# Patient Record
Sex: FEMALE | Race: BLACK OR AFRICAN AMERICAN | NOT HISPANIC OR LATINO
[De-identification: names, ages, dates, MRNs, and addresses within clinical notes are randomized per-mention and may not be internally consistent; named-entity substitution may affect disease eponyms.]

---

## 2017-01-03 ENCOUNTER — FORM ENCOUNTER (OUTPATIENT)
Age: 62
End: 2017-01-03

## 2017-01-04 ENCOUNTER — OUTPATIENT (OUTPATIENT)
Dept: OUTPATIENT SERVICES | Facility: HOSPITAL | Age: 62
LOS: 1 days | End: 2017-01-04
Payer: COMMERCIAL

## 2017-01-04 DIAGNOSIS — M16.11 UNILATERAL PRIMARY OSTEOARTHRITIS, RIGHT HIP: ICD-10-CM

## 2017-01-04 DIAGNOSIS — Z98.89 OTHER SPECIFIED POSTPROCEDURAL STATES: Chronic | ICD-10-CM

## 2017-01-04 DIAGNOSIS — M21.70 UNEQUAL LIMB LENGTH (ACQUIRED), UNSPECIFIED SITE: ICD-10-CM

## 2017-01-04 LAB
ANION GAP SERPL CALC-SCNC: 10 MMOL/L — SIGNIFICANT CHANGE UP (ref 9–16)
APPEARANCE UR: CLEAR — SIGNIFICANT CHANGE UP
APTT BLD: 33.4 SEC — SIGNIFICANT CHANGE UP (ref 27.5–37.4)
BACTERIA # UR AUTO: PRESENT /HPF
BILIRUB UR-MCNC: NEGATIVE — SIGNIFICANT CHANGE UP
BUN SERPL-MCNC: 17 MG/DL — SIGNIFICANT CHANGE UP (ref 7–23)
CALCIUM SERPL-MCNC: 8.9 MG/DL — SIGNIFICANT CHANGE UP (ref 8.5–10.5)
CHLORIDE SERPL-SCNC: 106 MMOL/L — SIGNIFICANT CHANGE UP (ref 96–108)
CO2 SERPL-SCNC: 27 MMOL/L — SIGNIFICANT CHANGE UP (ref 22–31)
COLOR SPEC: YELLOW — SIGNIFICANT CHANGE UP
CREAT SERPL-MCNC: 0.66 MG/DL — SIGNIFICANT CHANGE UP (ref 0.5–1.3)
DIFF PNL FLD: (no result)
EPI CELLS # UR: SIGNIFICANT CHANGE UP /HPF
GLUCOSE SERPL-MCNC: 93 MG/DL — SIGNIFICANT CHANGE UP (ref 70–99)
GLUCOSE UR QL: NEGATIVE — SIGNIFICANT CHANGE UP
HBA1C BLD-MCNC: 6.4 % — HIGH (ref 4.8–5.6)
HCT VFR BLD CALC: 33.9 % — LOW (ref 34.5–45)
HGB BLD-MCNC: 11.4 G/DL — LOW (ref 11.5–15.5)
INR BLD: 1.05 — SIGNIFICANT CHANGE UP (ref 0.88–1.16)
KETONES UR-MCNC: NEGATIVE — SIGNIFICANT CHANGE UP
LEUKOCYTE ESTERASE UR-ACNC: NEGATIVE — SIGNIFICANT CHANGE UP
MCHC RBC-ENTMCNC: 30.2 PG — SIGNIFICANT CHANGE UP (ref 27–34)
MCHC RBC-ENTMCNC: 33.6 G/DL — SIGNIFICANT CHANGE UP (ref 32–36)
MCV RBC AUTO: 89.9 FL — SIGNIFICANT CHANGE UP (ref 80–100)
NITRITE UR-MCNC: NEGATIVE — SIGNIFICANT CHANGE UP
PH UR: 5.5 — SIGNIFICANT CHANGE UP (ref 4–8)
PLATELET # BLD AUTO: 297 K/UL — SIGNIFICANT CHANGE UP (ref 150–400)
POTASSIUM SERPL-MCNC: 3.9 MMOL/L — SIGNIFICANT CHANGE UP (ref 3.5–5.3)
POTASSIUM SERPL-SCNC: 3.9 MMOL/L — SIGNIFICANT CHANGE UP (ref 3.5–5.3)
PROT UR-MCNC: NEGATIVE MG/DL — SIGNIFICANT CHANGE UP
PROTHROM AB SERPL-ACNC: 11.7 SEC — SIGNIFICANT CHANGE UP (ref 10–13.1)
RBC # BLD: 3.77 M/UL — LOW (ref 3.8–5.2)
RBC # FLD: 13.1 % — SIGNIFICANT CHANGE UP (ref 10.3–16.9)
RBC CASTS # UR COMP ASSIST: < 5 /HPF — SIGNIFICANT CHANGE UP
SODIUM SERPL-SCNC: 143 MMOL/L — SIGNIFICANT CHANGE UP (ref 135–145)
SP GR SPEC: <=1.005 — SIGNIFICANT CHANGE UP (ref 1–1.03)
UROBILINOGEN FLD QL: 0.2 E.U./DL — SIGNIFICANT CHANGE UP
WBC # BLD: 5.1 K/UL — SIGNIFICANT CHANGE UP (ref 3.8–10.5)
WBC # FLD AUTO: 5.1 K/UL — SIGNIFICANT CHANGE UP (ref 3.8–10.5)
WBC UR QL: < 5 /HPF — SIGNIFICANT CHANGE UP

## 2017-01-04 PROCEDURE — 83036 HEMOGLOBIN GLYCOSYLATED A1C: CPT

## 2017-01-04 PROCEDURE — 93005 ELECTROCARDIOGRAM TRACING: CPT

## 2017-01-04 PROCEDURE — 85027 COMPLETE CBC AUTOMATED: CPT

## 2017-01-04 PROCEDURE — 72100 X-RAY EXAM L-S SPINE 2/3 VWS: CPT

## 2017-01-04 PROCEDURE — 71046 X-RAY EXAM CHEST 2 VIEWS: CPT

## 2017-01-04 PROCEDURE — 81003 URINALYSIS AUTO W/O SCOPE: CPT

## 2017-01-04 PROCEDURE — 72100 X-RAY EXAM L-S SPINE 2/3 VWS: CPT | Mod: 26

## 2017-01-04 PROCEDURE — 81001 URINALYSIS AUTO W/SCOPE: CPT

## 2017-01-04 PROCEDURE — 71020: CPT | Mod: 26

## 2017-01-04 PROCEDURE — 80048 BASIC METABOLIC PNL TOTAL CA: CPT

## 2017-01-04 PROCEDURE — 72170 X-RAY EXAM OF PELVIS: CPT

## 2017-01-04 PROCEDURE — 93010 ELECTROCARDIOGRAM REPORT: CPT

## 2017-01-04 PROCEDURE — 85610 PROTHROMBIN TIME: CPT

## 2017-01-04 PROCEDURE — 85730 THROMBOPLASTIN TIME PARTIAL: CPT

## 2017-01-04 PROCEDURE — 72170 X-RAY EXAM OF PELVIS: CPT | Mod: 26

## 2017-01-04 PROCEDURE — 87086 URINE CULTURE/COLONY COUNT: CPT

## 2017-01-05 LAB
CULTURE RESULTS: NO GROWTH — SIGNIFICANT CHANGE UP
SPECIMEN SOURCE: SIGNIFICANT CHANGE UP

## 2017-01-09 ENCOUNTER — LABORATORY RESULT (OUTPATIENT)
Age: 62
End: 2017-01-09

## 2017-01-09 ENCOUNTER — APPOINTMENT (OUTPATIENT)
Dept: INTERNAL MEDICINE | Facility: CLINIC | Age: 62
End: 2017-01-09

## 2017-01-09 VITALS
HEART RATE: 78 BPM | TEMPERATURE: 97.4 F | SYSTOLIC BLOOD PRESSURE: 112 MMHG | OXYGEN SATURATION: 98 % | BODY MASS INDEX: 19.84 KG/M2 | DIASTOLIC BLOOD PRESSURE: 70 MMHG | WEIGHT: 112 LBS

## 2017-01-09 DIAGNOSIS — M21.612 BUNION OF LEFT FOOT: ICD-10-CM

## 2017-01-09 DIAGNOSIS — R22.1 LOCALIZED SWELLING, MASS AND LUMP, NECK: ICD-10-CM

## 2017-01-09 DIAGNOSIS — M19.90 UNSPECIFIED OSTEOARTHRITIS, UNSPECIFIED SITE: ICD-10-CM

## 2017-01-10 LAB
25(OH)D3 SERPL-MCNC: 30.4 NG/ML
ALBUMIN SERPL ELPH-MCNC: 4.2 G/DL
ALP BLD-CCNC: 78 U/L
ALT SERPL-CCNC: 21 U/L
ANION GAP SERPL CALC-SCNC: 12 MMOL/L
AST SERPL-CCNC: 24 U/L
BILIRUB SERPL-MCNC: 0.9 MG/DL
BUN SERPL-MCNC: 17 MG/DL
CALCIUM SERPL-MCNC: 9.6 MG/DL
CCP AB SER IA-ACNC: <8 UNITS
CHLORIDE SERPL-SCNC: 101 MMOL/L
CO2 SERPL-SCNC: 26 MMOL/L
CREAT SERPL-MCNC: 0.74 MG/DL
DEPRECATED KAPPA LC FREE/LAMBDA SER: 1.8 RATIO
FERRITIN SERPL-MCNC: 54.8 NG/ML
FOLATE SERPL-MCNC: 17.2 NG/ML
GLUCOSE SERPL-MCNC: 89 MG/DL
IGA SER QL IEP: 311 MG/DL
IGG SER QL IEP: 1540 MG/DL
IGM SER QL IEP: 151 MG/DL
IRON SATN MFR SERPL: 31 %
IRON SERPL-MCNC: 109 UG/DL
KAPPA LC CSF-MCNC: 1.16 MG/DL
KAPPA LC SERPL-MCNC: 2.09 MG/DL
POTASSIUM SERPL-SCNC: 4.4 MMOL/L
PROT SERPL-MCNC: 7.4 G/DL
RF+CCP IGG SER-IMP: NEGATIVE
RHEUMATOID FACT SER QL: <7 IU/ML
SODIUM SERPL-SCNC: 139 MMOL/L
TIBC SERPL-MCNC: 355 UG/DL
TSH SERPL-ACNC: 0.76 UIU/ML
UIBC SERPL-MCNC: 246 UG/DL
VIT B12 SERPL-MCNC: 852 PG/ML

## 2017-01-11 ENCOUNTER — OUTPATIENT (OUTPATIENT)
Dept: OUTPATIENT SERVICES | Facility: HOSPITAL | Age: 62
LOS: 1 days | End: 2017-01-11
Payer: COMMERCIAL

## 2017-01-11 DIAGNOSIS — Z98.89 OTHER SPECIFIED POSTPROCEDURAL STATES: Chronic | ICD-10-CM

## 2017-01-11 DIAGNOSIS — Z22.321 CARRIER OR SUSPECTED CARRIER OF METHICILLIN SUSCEPTIBLE STAPHYLOCOCCUS AUREUS: ICD-10-CM

## 2017-01-11 LAB
ALBUMIN MFR SERPL ELPH: 54.4 %
ALBUMIN SERPL-MCNC: 4.4 G/DL
ALBUMIN/GLOB SERPL: 1.2 RATIO
ALPHA1 GLOB MFR SERPL ELPH: 3.4 %
ALPHA1 GLOB SERPL ELPH-MCNC: 0.3 G/DL
ALPHA2 GLOB MFR SERPL ELPH: 10.5 %
ALPHA2 GLOB SERPL ELPH-MCNC: 0.8 G/DL
B-GLOBULIN MFR SERPL ELPH: 12.8 %
B-GLOBULIN SERPL ELPH-MCNC: 1 G/DL
GAMMA GLOB FLD ELPH-MCNC: 1.5 G/DL
GAMMA GLOB MFR SERPL ELPH: 18.9 %
INTERPRETATION SERPL IEP-IMP: NORMAL
M PROTEIN SPEC IFE-MCNC: NORMAL
PROT SERPL-MCNC: 8 G/DL
PROT SERPL-MCNC: 8 G/DL

## 2017-01-11 PROCEDURE — 76536 US EXAM OF HEAD AND NECK: CPT | Mod: 26

## 2017-01-11 PROCEDURE — 76536 US EXAM OF HEAD AND NECK: CPT

## 2017-01-11 PROCEDURE — 87641 MR-STAPH DNA AMP PROBE: CPT

## 2017-01-12 LAB
MRSA PCR RESULT.: NEGATIVE — SIGNIFICANT CHANGE UP
S AUREUS DNA NOSE QL NAA+PROBE: NEGATIVE — SIGNIFICANT CHANGE UP

## 2017-01-17 ENCOUNTER — CLINICAL ADVICE (OUTPATIENT)
Age: 62
End: 2017-01-17

## 2017-01-23 ENCOUNTER — FORM ENCOUNTER (OUTPATIENT)
Age: 62
End: 2017-01-23

## 2017-01-23 ENCOUNTER — RESULT REVIEW (OUTPATIENT)
Age: 62
End: 2017-01-23

## 2017-01-24 ENCOUNTER — OUTPATIENT (OUTPATIENT)
Dept: OUTPATIENT SERVICES | Facility: HOSPITAL | Age: 62
LOS: 1 days | End: 2017-01-24
Payer: COMMERCIAL

## 2017-01-24 DIAGNOSIS — Z98.89 OTHER SPECIFIED POSTPROCEDURAL STATES: Chronic | ICD-10-CM

## 2017-01-24 PROCEDURE — 10022: CPT

## 2017-01-24 PROCEDURE — 76942 ECHO GUIDE FOR BIOPSY: CPT | Mod: 26

## 2017-01-24 PROCEDURE — 76942 ECHO GUIDE FOR BIOPSY: CPT

## 2017-01-24 PROCEDURE — 88173 CYTOPATH EVAL FNA REPORT: CPT

## 2017-01-25 ENCOUNTER — RESULT REVIEW (OUTPATIENT)
Age: 62
End: 2017-01-25

## 2017-01-25 ENCOUNTER — MEDICATION RENEWAL (OUTPATIENT)
Age: 62
End: 2017-01-25

## 2017-01-25 VITALS
HEART RATE: 88 BPM | DIASTOLIC BLOOD PRESSURE: 73 MMHG | WEIGHT: 111.99 LBS | OXYGEN SATURATION: 100 % | RESPIRATION RATE: 16 BRPM | HEIGHT: 64 IN | TEMPERATURE: 98 F | SYSTOLIC BLOOD PRESSURE: 123 MMHG

## 2017-01-25 RX ORDER — OXYCODONE HYDROCHLORIDE 5 MG/1
20 TABLET ORAL ONCE
Qty: 0 | Refills: 0 | Status: DISCONTINUED | OUTPATIENT
Start: 2017-01-26 | End: 2017-01-26

## 2017-01-25 RX ORDER — CELECOXIB 200 MG/1
200 CAPSULE ORAL ONCE
Qty: 0 | Refills: 0 | Status: COMPLETED | OUTPATIENT
Start: 2017-01-26 | End: 2017-01-26

## 2017-01-25 NOTE — H&P ADULT. - FAMILY HISTORY
Father  Still living? Unknown  Family history of asthma, Age at diagnosis: Age Unknown  Family history of malignant neoplasm of prostate in father, Age at diagnosis: Age Unknown     Mother  Still living? Unknown  Family history of hypertension, Age at diagnosis: Age Unknown

## 2017-01-25 NOTE — H&P ADULT. - PMH
Acquired leg length discrepancy    Anemia  mild  Anxiety    Asthma    Cardiac murmur    Hyperthyroidism    Sarcoidosis    Tremor

## 2017-01-25 NOTE — H&P ADULT. - PROBLEM SELECTOR PLAN 1
Admit to Orthopaedic Service.  Presents today for elective Right Total Hip Arthroplasty, conversion of previous hip surgery.  Pt medially stable and cleared for procedure today by Dr. Deni MD; Dr. Mckenzie MD

## 2017-01-25 NOTE — H&P ADULT. - ASSESSMENT
62 yo F admitted with acquired leg length discrepancy, primary localized osteoarthritis of the right hip

## 2017-01-25 NOTE — H&P ADULT. - HISTORY OF PRESENT ILLNESS
62 yo F presents c/o right hip pain x    Presents today for elective Right Total Hip Arthroplasty, Conversion of previous surgery. 62 yo F presents c/o right hip pain x 7 years which began initially when a pt twisted her ankle and "felt a pop" in her right hip. She ended up requiring a Right hip labral repair. Pt pain has progressively worsened without improvement and has become increasingly unresponsive to conservative management. Pt reports constant pain and pins and needles in his right leg. Pain radiates from her low back to her leg. Pt ambulates with a cane 24/7 for assistance at home and in community. Denies numbness/tingling of extremities, F/C/N/V, CP, SOB today.     Presents today for elective Right Total Hip Arthroplasty, Conversion of previous surgery.

## 2017-01-25 NOTE — H&P ADULT. - MUSCULOSKELETAL COMMENTS
Minimally diminished sensation to Left great toe. Sensation intact elsewhere to bilateral LE distally. Motor Strength 5/5 to Quad/TA/GS/EHL/FHL bilaterally.

## 2017-01-26 ENCOUNTER — APPOINTMENT (OUTPATIENT)
Dept: ORTHOPEDIC SURGERY | Facility: HOSPITAL | Age: 62
End: 2017-01-26

## 2017-01-26 ENCOUNTER — INPATIENT (INPATIENT)
Facility: HOSPITAL | Age: 62
LOS: 1 days | Discharge: HOME | DRG: 470 | End: 2017-01-28
Attending: ORTHOPAEDIC SURGERY | Admitting: ORTHOPAEDIC SURGERY
Payer: COMMERCIAL

## 2017-01-26 DIAGNOSIS — Z98.890 OTHER SPECIFIED POSTPROCEDURAL STATES: Chronic | ICD-10-CM

## 2017-01-26 DIAGNOSIS — M21.70 UNEQUAL LIMB LENGTH (ACQUIRED), UNSPECIFIED SITE: ICD-10-CM

## 2017-01-26 DIAGNOSIS — Z98.89 OTHER SPECIFIED POSTPROCEDURAL STATES: Chronic | ICD-10-CM

## 2017-01-26 DIAGNOSIS — M16.11 UNILATERAL PRIMARY OSTEOARTHRITIS, RIGHT HIP: ICD-10-CM

## 2017-01-26 PROCEDURE — 72170 X-RAY EXAM OF PELVIS: CPT | Mod: 26

## 2017-01-26 PROCEDURE — 27130 TOTAL HIP ARTHROPLASTY: CPT | Mod: RT

## 2017-01-26 PROCEDURE — 76000 FLUOROSCOPY <1 HR PHYS/QHP: CPT | Mod: 26,59

## 2017-01-26 RX ORDER — ONDANSETRON 8 MG/1
4 TABLET, FILM COATED ORAL EVERY 6 HOURS
Qty: 0 | Refills: 0 | Status: DISCONTINUED | OUTPATIENT
Start: 2017-01-26 | End: 2017-01-28

## 2017-01-26 RX ORDER — MORPHINE SULFATE 50 MG/1
2 CAPSULE, EXTENDED RELEASE ORAL
Qty: 0 | Refills: 0 | Status: DISCONTINUED | OUTPATIENT
Start: 2017-01-26 | End: 2017-01-28

## 2017-01-26 RX ORDER — MORPHINE SULFATE 50 MG/1
4 CAPSULE, EXTENDED RELEASE ORAL
Qty: 0 | Refills: 0 | Status: DISCONTINUED | OUTPATIENT
Start: 2017-01-26 | End: 2017-01-28

## 2017-01-26 RX ORDER — SENNA PLUS 8.6 MG/1
2 TABLET ORAL AT BEDTIME
Qty: 0 | Refills: 0 | Status: DISCONTINUED | OUTPATIENT
Start: 2017-01-26 | End: 2017-01-28

## 2017-01-26 RX ORDER — FERROUS SULFATE 325(65) MG
0 TABLET ORAL
Qty: 0 | Refills: 0 | COMMUNITY

## 2017-01-26 RX ORDER — PANTOPRAZOLE SODIUM 20 MG/1
40 TABLET, DELAYED RELEASE ORAL DAILY
Qty: 0 | Refills: 0 | Status: DISCONTINUED | OUTPATIENT
Start: 2017-01-26 | End: 2017-01-28

## 2017-01-26 RX ORDER — ASPIRIN/CALCIUM CARB/MAGNESIUM 324 MG
325 TABLET ORAL
Qty: 0 | Refills: 0 | Status: DISCONTINUED | OUTPATIENT
Start: 2017-01-26 | End: 2017-01-28

## 2017-01-26 RX ORDER — CHOLECALCIFEROL (VITAMIN D3) 125 MCG
1 CAPSULE ORAL
Qty: 0 | Refills: 0 | COMMUNITY

## 2017-01-26 RX ORDER — ACETAMINOPHEN 500 MG
650 TABLET ORAL EVERY 6 HOURS
Qty: 0 | Refills: 0 | Status: DISCONTINUED | OUTPATIENT
Start: 2017-01-26 | End: 2017-01-28

## 2017-01-26 RX ORDER — CEFAZOLIN SODIUM 1 G
2000 VIAL (EA) INJECTION EVERY 8 HOURS
Qty: 0 | Refills: 0 | Status: COMPLETED | OUTPATIENT
Start: 2017-01-26 | End: 2017-01-27

## 2017-01-26 RX ORDER — BUPIVACAINE 13.3 MG/ML
20 INJECTION, SUSPENSION, LIPOSOMAL INFILTRATION ONCE
Qty: 0 | Refills: 0 | Status: DISCONTINUED | OUTPATIENT
Start: 2017-01-26 | End: 2017-01-28

## 2017-01-26 RX ORDER — CELECOXIB 200 MG/1
200 CAPSULE ORAL
Qty: 0 | Refills: 0 | Status: DISCONTINUED | OUTPATIENT
Start: 2017-01-26 | End: 2017-01-28

## 2017-01-26 RX ORDER — OXYCODONE HYDROCHLORIDE 5 MG/1
10 TABLET ORAL EVERY 12 HOURS
Qty: 0 | Refills: 0 | Status: DISCONTINUED | OUTPATIENT
Start: 2017-01-26 | End: 2017-01-28

## 2017-01-26 RX ORDER — POLYETHYLENE GLYCOL 3350 17 G/17G
17 POWDER, FOR SOLUTION ORAL DAILY
Qty: 0 | Refills: 0 | Status: DISCONTINUED | OUTPATIENT
Start: 2017-01-26 | End: 2017-01-28

## 2017-01-26 RX ORDER — MAGNESIUM HYDROXIDE 400 MG/1
30 TABLET, CHEWABLE ORAL DAILY
Qty: 0 | Refills: 0 | Status: DISCONTINUED | OUTPATIENT
Start: 2017-01-26 | End: 2017-01-28

## 2017-01-26 RX ORDER — OXYCODONE HYDROCHLORIDE 5 MG/1
10 TABLET ORAL EVERY 4 HOURS
Qty: 0 | Refills: 0 | Status: DISCONTINUED | OUTPATIENT
Start: 2017-01-26 | End: 2017-01-28

## 2017-01-26 RX ORDER — OXYCODONE HYDROCHLORIDE 5 MG/1
5 TABLET ORAL EVERY 4 HOURS
Qty: 0 | Refills: 0 | Status: DISCONTINUED | OUTPATIENT
Start: 2017-01-26 | End: 2017-01-28

## 2017-01-26 RX ORDER — DOCUSATE SODIUM 100 MG
100 CAPSULE ORAL THREE TIMES A DAY
Qty: 0 | Refills: 0 | Status: DISCONTINUED | OUTPATIENT
Start: 2017-01-26 | End: 2017-01-28

## 2017-01-26 RX ORDER — MORPHINE SULFATE 50 MG/1
2 CAPSULE, EXTENDED RELEASE ORAL
Qty: 0 | Refills: 0 | Status: DISCONTINUED | OUTPATIENT
Start: 2017-01-26 | End: 2017-01-26

## 2017-01-26 RX ORDER — PREGABALIN 225 MG/1
1 CAPSULE ORAL
Qty: 0 | Refills: 0 | COMMUNITY

## 2017-01-26 RX ORDER — KETOROLAC TROMETHAMINE 30 MG/ML
15 SYRINGE (ML) INJECTION EVERY 4 HOURS
Qty: 0 | Refills: 0 | Status: DISCONTINUED | OUTPATIENT
Start: 2017-01-26 | End: 2017-01-28

## 2017-01-26 RX ORDER — SODIUM CHLORIDE 9 MG/ML
1000 INJECTION, SOLUTION INTRAVENOUS
Qty: 0 | Refills: 0 | Status: DISCONTINUED | OUTPATIENT
Start: 2017-01-26 | End: 2017-01-28

## 2017-01-26 RX ADMIN — Medication 100 MILLIGRAM(S): at 16:30

## 2017-01-26 RX ADMIN — OXYCODONE HYDROCHLORIDE 10 MILLIGRAM(S): 5 TABLET ORAL at 22:04

## 2017-01-26 RX ADMIN — ONDANSETRON 4 MILLIGRAM(S): 8 TABLET, FILM COATED ORAL at 21:59

## 2017-01-26 RX ADMIN — Medication 100 MILLIGRAM(S): at 22:04

## 2017-01-26 RX ADMIN — OXYCODONE HYDROCHLORIDE 20 MILLIGRAM(S): 5 TABLET ORAL at 07:12

## 2017-01-26 RX ADMIN — Medication 325 MILLIGRAM(S): at 22:04

## 2017-01-26 RX ADMIN — OXYCODONE HYDROCHLORIDE 10 MILLIGRAM(S): 5 TABLET ORAL at 23:04

## 2017-01-26 RX ADMIN — SODIUM CHLORIDE 120 MILLILITER(S): 9 INJECTION, SOLUTION INTRAVENOUS at 14:31

## 2017-01-26 RX ADMIN — SENNA PLUS 2 TABLET(S): 8.6 TABLET ORAL at 22:04

## 2017-01-26 RX ADMIN — CELECOXIB 200 MILLIGRAM(S): 200 CAPSULE ORAL at 07:12

## 2017-01-26 RX ADMIN — SODIUM CHLORIDE 120 MILLILITER(S): 9 INJECTION, SOLUTION INTRAVENOUS at 22:04

## 2017-01-26 NOTE — PHYSICAL THERAPY INITIAL EVALUATION ADULT - PERTINENT HX OF CURRENT PROBLEM, REHAB EVAL
62 yo F presents c/o right hip pain x 7 years which began initially when a pt twisted her ankle and "felt a pop" in her right hip. She ended up requiring a Right hip labral repair. Pt pain has progressively worsened without improvement and has become increasingly unresponsive to conservative management. Pt reports constant pain and pins and needles in his right leg. Pain radiates from her low back to her leg. Pt ambulates with a cane 24/7

## 2017-01-26 NOTE — PHYSICAL THERAPY INITIAL EVALUATION ADULT - ADDITIONAL COMMENTS
Patient lives in one floor apartment with 8 steps to enter and a railing on the left side. Patient ambulates with a cane at all times and can ambulate for 15city blocks at baseline.

## 2017-01-26 NOTE — PROGRESS NOTE ADULT - SUBJECTIVE AND OBJECTIVE BOX
Ortho Post Op Check    Procedure: Right THR  Surgeon: Dr. Moon    Pt comfortable without complaints, pain controlled  Denies CP, SOB, N/V, numbness/tingling     Vital Signs Last 24 Hrs  T(C): 36.3, Max: 36.8 (01-26 @ 12:15)  T(F): 97.4, Max: 98.2 (01-26 @ 12:15)  HR: 83 (80 - 85)  BP: 121/73 (98/66 - 121/73)  BP(mean): --  RR: 16 (16 - 16)  SpO2: 100% (100% - 100%)  Wt(kg): --    General: Pt Alert and oriented, NAD  DSG C/D/I Right hip  Pulse DP 2+ Right LE  Sensation intact Right LE  Motor: EHL/FHL/TA/GS 5/5 Right LE    Post-op X-Ray: prosthesis in place    A/P: 61yFemale POD#0 s/p Right THR  - Stable  - Pain Control  - DVT ppx: ASA  - Post op abx: Ancef  - PT, WBS: WBAT    Ortho Pager [phone]

## 2017-01-27 ENCOUNTER — TRANSCRIPTION ENCOUNTER (OUTPATIENT)
Age: 62
End: 2017-01-27

## 2017-01-27 LAB
ANION GAP SERPL CALC-SCNC: 9 MMOL/L — SIGNIFICANT CHANGE UP (ref 9–16)
BUN SERPL-MCNC: 9 MG/DL — SIGNIFICANT CHANGE UP (ref 7–23)
CALCIUM SERPL-MCNC: 8.5 MG/DL — SIGNIFICANT CHANGE UP (ref 8.5–10.5)
CHLORIDE SERPL-SCNC: 103 MMOL/L — SIGNIFICANT CHANGE UP (ref 96–108)
CO2 SERPL-SCNC: 26 MMOL/L — SIGNIFICANT CHANGE UP (ref 22–31)
CREAT SERPL-MCNC: 0.48 MG/DL — LOW (ref 0.5–1.3)
GLUCOSE SERPL-MCNC: 132 MG/DL — HIGH (ref 70–99)
HCT VFR BLD CALC: 29.4 % — LOW (ref 34.5–45)
HGB BLD-MCNC: 10 G/DL — LOW (ref 11.5–15.5)
MCHC RBC-ENTMCNC: 29.9 PG — SIGNIFICANT CHANGE UP (ref 27–34)
MCHC RBC-ENTMCNC: 34 G/DL — SIGNIFICANT CHANGE UP (ref 32–36)
MCV RBC AUTO: 88 FL — SIGNIFICANT CHANGE UP (ref 80–100)
PLATELET # BLD AUTO: 243 K/UL — SIGNIFICANT CHANGE UP (ref 150–400)
POTASSIUM SERPL-MCNC: 4.1 MMOL/L — SIGNIFICANT CHANGE UP (ref 3.5–5.3)
POTASSIUM SERPL-SCNC: 4.1 MMOL/L — SIGNIFICANT CHANGE UP (ref 3.5–5.3)
RBC # BLD: 3.34 M/UL — LOW (ref 3.8–5.2)
RBC # FLD: 11.8 % — SIGNIFICANT CHANGE UP (ref 10.3–16.9)
SODIUM SERPL-SCNC: 138 MMOL/L — SIGNIFICANT CHANGE UP (ref 135–145)
WBC # BLD: 10.9 K/UL — HIGH (ref 3.8–10.5)
WBC # FLD AUTO: 10.9 K/UL — HIGH (ref 3.8–10.5)

## 2017-01-27 RX ORDER — SCOPALAMINE 1 MG/3D
1.5 PATCH, EXTENDED RELEASE TRANSDERMAL ONCE
Qty: 0 | Refills: 0 | Status: COMPLETED | OUTPATIENT
Start: 2017-01-27 | End: 2017-01-27

## 2017-01-27 RX ADMIN — PANTOPRAZOLE SODIUM 40 MILLIGRAM(S): 20 TABLET, DELAYED RELEASE ORAL at 13:20

## 2017-01-27 RX ADMIN — Medication 100 MILLIGRAM(S): at 00:22

## 2017-01-27 RX ADMIN — CELECOXIB 200 MILLIGRAM(S): 200 CAPSULE ORAL at 17:56

## 2017-01-27 RX ADMIN — CELECOXIB 200 MILLIGRAM(S): 200 CAPSULE ORAL at 13:20

## 2017-01-27 RX ADMIN — Medication 325 MILLIGRAM(S): at 17:56

## 2017-01-27 RX ADMIN — Medication 1 TABLET(S): at 13:20

## 2017-01-27 RX ADMIN — OXYCODONE HYDROCHLORIDE 10 MILLIGRAM(S): 5 TABLET ORAL at 11:30

## 2017-01-27 RX ADMIN — Medication 325 MILLIGRAM(S): at 06:10

## 2017-01-27 RX ADMIN — OXYCODONE HYDROCHLORIDE 10 MILLIGRAM(S): 5 TABLET ORAL at 11:02

## 2017-01-27 RX ADMIN — CELECOXIB 200 MILLIGRAM(S): 200 CAPSULE ORAL at 18:30

## 2017-01-27 RX ADMIN — CELECOXIB 200 MILLIGRAM(S): 200 CAPSULE ORAL at 14:00

## 2017-01-27 RX ADMIN — SCOPALAMINE 1.5 MILLIGRAM(S): 1 PATCH, EXTENDED RELEASE TRANSDERMAL at 09:40

## 2017-01-27 RX ADMIN — SENNA PLUS 2 TABLET(S): 8.6 TABLET ORAL at 20:30

## 2017-01-27 RX ADMIN — Medication 100 MILLIGRAM(S): at 13:20

## 2017-01-27 RX ADMIN — ONDANSETRON 4 MILLIGRAM(S): 8 TABLET, FILM COATED ORAL at 09:18

## 2017-01-27 RX ADMIN — Medication 100 MILLIGRAM(S): at 20:30

## 2017-01-27 RX ADMIN — Medication 100 MILLIGRAM(S): at 06:10

## 2017-01-27 RX ADMIN — POLYETHYLENE GLYCOL 3350 17 GRAM(S): 17 POWDER, FOR SOLUTION ORAL at 13:20

## 2017-01-27 NOTE — DISCHARGE NOTE ADULT - CARE PLAN
Principal Discharge DX:	Primary localized osteoarthritis of right hip  Goal:	Pain Management, improve mobility  Instructions for follow-up, activity and diet:	see below

## 2017-01-27 NOTE — DISCHARGE NOTE ADULT - CARE PROVIDER_API CALL
Manjinder Moon), Orthopaedic Surgery  [address] [place] [address]  Phone: [phone]  Fax: [phone]

## 2017-01-27 NOTE — PROGRESS NOTE ADULT - SUBJECTIVE AND OBJECTIVE BOX
S: Patient seen and examined. No acute events overnight.    ICU Vital Signs Last 24 Hrs  T(C): 36.4, Max: 36.8 (01-26 @ 12:15)  T(F): 97.6, Max: 98.2 (01-26 @ 12:15)  HR: 76 (76 - 85)  BP: 100/59 (93/46 - 131/79)  BP(mean): --  ABP: --  ABP(mean): --  RR: 16 (16 - 16)  SpO2: 100% (98% - 100%)    Motor: 5/5 GS/TA/EHL/FHL    Sensation: SILT distally  Pulses: WWP, DP/PT 2+    Dressing: Aquacel C/D/I              A/P:  61y Female s/p R anterior FLACA, POD # 1    -Stable  -Pain Control  -PT/WBAT  -DVT ppx: ASA BID  -Advance diet as tolerated  -f/u AM labs  -Dispo: Home v Rehab    Elvin Branch MD  Ortho Surg Resident

## 2017-01-27 NOTE — DISCHARGE NOTE ADULT - ADDITIONAL INSTRUCTIONS
No strenuous activity, heavy lifting, driving, tub bathing, or returning to work until cleared by MD.  You may shower--dressing is waterproof.  Remove dressing after post op day 7, then leave incision open to air.  Follow up with Dr. Moon call  to schedule an appt within 10-14 days.  If you don't have a bowel movement by post op day 3, then take Milk of Magnesia (over the counter).  If no bowel movement by at least post op day 5, then use a Dulcolax suppository (over the counter) and/or a Fleets enema--if still no bowel movement, call your MD.  Contact your doctor if you experience: fever greater than 101.5, chills, chest pain, difficulty breathing, bleeding, redness or heat around the incision.     Please follow up with your primary care provider.

## 2017-01-27 NOTE — DISCHARGE NOTE ADULT - PATIENT PORTAL LINK FT
“You can access the [facility] Patient Portal, offered by , by registering with the following website: [email or web address]”

## 2017-01-27 NOTE — DISCHARGE NOTE ADULT - MEDICATION SUMMARY - MEDICATIONS TO TAKE
I will START or STAY ON the medications listed below when I get home from the hospital:    advair  --     -- Indication: For Home med    omega fish oil  -- 1000 milligram(s) by mouth once a day  -- Indication: For Home med    ferrous sulfate 200 mg (65 mg elemental iron) oral tablet  --  by mouth once a day  -- Indication: For Home med    Vitamin D3 400 intl units oral capsule  -- 1 cap(s) by mouth once a day  -- Indication: For Home med    Vitamin B12 500 mcg oral tablet  -- 1 tab(s) by mouth once a day  -- Indication: For Home med

## 2017-01-27 NOTE — PROGRESS NOTE ADULT - SUBJECTIVE AND OBJECTIVE BOX
ORTHO NOTE    [ x] Pt seen/examined. 9am  [ ] Pt without any complaints/in NAD.    [x ] Pt complains of: Incisional pain     [ ] ROS  otherwise negative    .    PHYSICAL EXAM:    Vital Signs Last 24 Hrs  T(C): 36.1, Max: 36.8 (01-26 @ 12:15)  T(F): 97, Max: 98.2 (01-26 @ 12:15)  HR: 66 (66 - 85)  BP: 101/66 (93/46 - 131/79)  BP(mean): --  RR: 16 (16 - 16)  SpO2: 100% (98% - 100%)    I&O's Detail  I & Os for 24h ending 27 Jan 2017 07:00  =============================================  IN:    lactated ringers.: 1320 ml    Other: 360 ml    IV PiggyBack: 50 ml    Total IN: 1730 ml  ---------------------------------------------  OUT:    Voided: 1400 ml    Total OUT: 1400 ml  ---------------------------------------------  Total NET: 330 ml    I & Os for current day (as of 27 Jan 2017 11:05)  =============================================  IN:    Oral Fluid: 300 ml    Total IN: 300 ml  ---------------------------------------------  OUT:    Voided: 950 ml    Total OUT: 950 ml  ---------------------------------------------  Total NET: -650 ml       CAPILLARY BLOOD GLUCOSE                  Neuro: A&0x3    Lungs: Denies SOB    CV: Denies chest pain    ABD: NON distended positive bowel sounds      Ext: NVID Dressing clean dry and intact.      LABS                        10.0   10.9  )-----------( 243      ( 27 Jan 2017 06:42 )             29.4                                27 Jan 2017 06:41    138    |  103    |  9      ----------------------------<  132    4.1     |  26     |  0.48     Ca    8.5        27 Jan 2017 06:41         ASSESSMENT/PLAN:      STATUS POST: Right THR (anterior)    CONTINUE:          [x ] PT WBAT    [x ] DVT PPX- ASA    x[ ] Pain MgtMEDICATIONS  (PRN):  ketorolac   Injectable 15milliGRAM(s) IV Push every 4 hours PRN Moderate Pain (4 - 6)  oxyCODONE IR 5milliGRAM(s) Oral every 4 hours PRN Mild Pain  oxyCODONE IR 10milliGRAM(s) Oral every 4 hours PRN Moderate Pain  morphine  - Injectable 4milliGRAM(s) IV Push every 15 minutes PRN Severe Pain (7 - 10)  morphine  - Injectable 2milliGRAM(s) IV Push every 3 hours PRN Severe Pain      [x ] Dispo plan- Home

## 2017-01-27 NOTE — DISCHARGE NOTE ADULT - HOSPITAL COURSE
Admit 1/26/17  Surgery S/P Right FLACA (anterior) 1/26/17  Pre/post-op Antibiotics  DVT prophylaxis  Physical Therapy  Pain Management

## 2017-01-28 VITALS
TEMPERATURE: 98 F | OXYGEN SATURATION: 98 % | SYSTOLIC BLOOD PRESSURE: 130 MMHG | DIASTOLIC BLOOD PRESSURE: 62 MMHG | RESPIRATION RATE: 16 BRPM

## 2017-01-28 LAB
ANION GAP SERPL CALC-SCNC: 5 MMOL/L — LOW (ref 9–16)
BUN SERPL-MCNC: 10 MG/DL — SIGNIFICANT CHANGE UP (ref 7–23)
CALCIUM SERPL-MCNC: 8.6 MG/DL — SIGNIFICANT CHANGE UP (ref 8.5–10.5)
CHLORIDE SERPL-SCNC: 106 MMOL/L — SIGNIFICANT CHANGE UP (ref 96–108)
CO2 SERPL-SCNC: 29 MMOL/L — SIGNIFICANT CHANGE UP (ref 22–31)
CREAT SERPL-MCNC: 0.66 MG/DL — SIGNIFICANT CHANGE UP (ref 0.5–1.3)
GLUCOSE SERPL-MCNC: 121 MG/DL — HIGH (ref 70–99)
HCT VFR BLD CALC: 30.2 % — LOW (ref 34.5–45)
HGB BLD-MCNC: 10.1 G/DL — LOW (ref 11.5–15.5)
MCHC RBC-ENTMCNC: 30.1 PG — SIGNIFICANT CHANGE UP (ref 27–34)
MCHC RBC-ENTMCNC: 33.4 G/DL — SIGNIFICANT CHANGE UP (ref 32–36)
MCV RBC AUTO: 89.9 FL — SIGNIFICANT CHANGE UP (ref 80–100)
NON-GYNECOLOGICAL CYTOLOGY STUDY: SIGNIFICANT CHANGE UP
PLATELET # BLD AUTO: 232 K/UL — SIGNIFICANT CHANGE UP (ref 150–400)
POTASSIUM SERPL-MCNC: 3.8 MMOL/L — SIGNIFICANT CHANGE UP (ref 3.5–5.3)
POTASSIUM SERPL-SCNC: 3.8 MMOL/L — SIGNIFICANT CHANGE UP (ref 3.5–5.3)
RBC # BLD: 3.36 M/UL — LOW (ref 3.8–5.2)
RBC # FLD: 12.4 % — SIGNIFICANT CHANGE UP (ref 10.3–16.9)
SODIUM SERPL-SCNC: 140 MMOL/L — SIGNIFICANT CHANGE UP (ref 135–145)
WBC # BLD: 8.8 K/UL — SIGNIFICANT CHANGE UP (ref 3.8–10.5)
WBC # FLD AUTO: 8.8 K/UL — SIGNIFICANT CHANGE UP (ref 3.8–10.5)

## 2017-01-28 PROCEDURE — 86850 RBC ANTIBODY SCREEN: CPT

## 2017-01-28 PROCEDURE — 85027 COMPLETE CBC AUTOMATED: CPT

## 2017-01-28 PROCEDURE — 36415 COLL VENOUS BLD VENIPUNCTURE: CPT

## 2017-01-28 PROCEDURE — 72170 X-RAY EXAM OF PELVIS: CPT

## 2017-01-28 PROCEDURE — 86901 BLOOD TYPING SEROLOGIC RH(D): CPT

## 2017-01-28 PROCEDURE — 86900 BLOOD TYPING SEROLOGIC ABO: CPT

## 2017-01-28 PROCEDURE — C1776: CPT

## 2017-01-28 PROCEDURE — 88300 SURGICAL PATH GROSS: CPT

## 2017-01-28 PROCEDURE — 76000 FLUOROSCOPY <1 HR PHYS/QHP: CPT

## 2017-01-28 PROCEDURE — 97161 PT EVAL LOW COMPLEX 20 MIN: CPT

## 2017-01-28 PROCEDURE — 80048 BASIC METABOLIC PNL TOTAL CA: CPT

## 2017-01-28 PROCEDURE — 97116 GAIT TRAINING THERAPY: CPT

## 2017-01-28 PROCEDURE — C1713: CPT

## 2017-01-28 RX ADMIN — PANTOPRAZOLE SODIUM 40 MILLIGRAM(S): 20 TABLET, DELAYED RELEASE ORAL at 13:38

## 2017-01-28 RX ADMIN — CELECOXIB 200 MILLIGRAM(S): 200 CAPSULE ORAL at 14:30

## 2017-01-28 RX ADMIN — OXYCODONE HYDROCHLORIDE 10 MILLIGRAM(S): 5 TABLET ORAL at 13:38

## 2017-01-28 RX ADMIN — Medication 325 MILLIGRAM(S): at 19:26

## 2017-01-28 RX ADMIN — CELECOXIB 200 MILLIGRAM(S): 200 CAPSULE ORAL at 13:38

## 2017-01-28 RX ADMIN — Medication 100 MILLIGRAM(S): at 13:38

## 2017-01-28 RX ADMIN — CELECOXIB 200 MILLIGRAM(S): 200 CAPSULE ORAL at 19:26

## 2017-01-28 RX ADMIN — Medication 1 TABLET(S): at 13:38

## 2017-01-28 RX ADMIN — CELECOXIB 200 MILLIGRAM(S): 200 CAPSULE ORAL at 20:00

## 2017-01-28 RX ADMIN — Medication 30 MILLILITER(S): at 14:46

## 2017-01-28 RX ADMIN — Medication 325 MILLIGRAM(S): at 06:08

## 2017-01-28 RX ADMIN — OXYCODONE HYDROCHLORIDE 10 MILLIGRAM(S): 5 TABLET ORAL at 14:30

## 2017-01-28 RX ADMIN — Medication 100 MILLIGRAM(S): at 06:08

## 2017-01-28 NOTE — PROGRESS NOTE ADULT - SUBJECTIVE AND OBJECTIVE BOX
S: Patient seen and examined. Pt. doing well, Pain endorsed but controlled. No acute events overnight.    ICU Vital Signs Last 24 Hrs  T(C): 36.8, Max: 36.9 (01-27 @ 15:26)  T(F): 98.2, Max: 98.5 (01-27 @ 15:26)  HR: 98 (66 - 98)  BP: 109/62 (97/59 - 109/62)  BP(mean): --  ABP: --  ABP(mean): --  RR: 16 (15 - 16)  SpO2: 100% (97% - 100%)    Motor: 5/5 GS/TA/EHL/FHL    Sensation: SILT   Pulses: WWP, DP/PT 2+    Dressing: C/D/I                          10.0   10.9  )-----------( 243      ( 27 Jan 2017 06:42 )             29.4             A/P:  61y Female s/p  R anterior FLACA  , POD#   2  -Stable  -Pain Control  -PT/WBAT  -DVT ppx: ASA  -Advance diet as tolerated  -f/u AM labs  -Dispo: Home today    Elvin Branch MD  Ortho Surg Resident

## 2017-02-01 DIAGNOSIS — F41.9 ANXIETY DISORDER, UNSPECIFIED: ICD-10-CM

## 2017-02-01 DIAGNOSIS — M16.11 UNILATERAL PRIMARY OSTEOARTHRITIS, RIGHT HIP: ICD-10-CM

## 2017-02-01 DIAGNOSIS — D86.9 SARCOIDOSIS, UNSPECIFIED: ICD-10-CM

## 2017-02-01 DIAGNOSIS — K21.9 GASTRO-ESOPHAGEAL REFLUX DISEASE WITHOUT ESOPHAGITIS: ICD-10-CM

## 2017-02-01 DIAGNOSIS — M21.70 UNEQUAL LIMB LENGTH (ACQUIRED), UNSPECIFIED SITE: ICD-10-CM

## 2017-02-01 DIAGNOSIS — D64.9 ANEMIA, UNSPECIFIED: ICD-10-CM

## 2017-02-01 DIAGNOSIS — J45.909 UNSPECIFIED ASTHMA, UNCOMPLICATED: ICD-10-CM

## 2017-02-02 NOTE — DISCHARGE NOTE ADULT - NSTOBACCONEVERSMOKERY/N_GEN_A
----- Message from Kirs Kraus MA sent at 2/1/2017  8:26 AM CST -----  Contact: self - [phone]  Requesting to speak a Nurse this morning regarding a health issue. Refused to leave details. Please call. Thanks!   
Attempted to contact pt to address her concerns, no answer left vm.  
Get a U/A and culture, if possible  Spoke to treasure  
No

## 2017-02-07 LAB — SURGICAL PATHOLOGY STUDY: SIGNIFICANT CHANGE UP

## 2017-02-14 ENCOUNTER — FORM ENCOUNTER (OUTPATIENT)
Age: 62
End: 2017-02-14

## 2017-02-15 ENCOUNTER — OUTPATIENT (OUTPATIENT)
Dept: OUTPATIENT SERVICES | Facility: HOSPITAL | Age: 62
LOS: 1 days | End: 2017-02-15
Payer: COMMERCIAL

## 2017-02-15 ENCOUNTER — APPOINTMENT (OUTPATIENT)
Dept: ORTHOPEDIC SURGERY | Facility: CLINIC | Age: 62
End: 2017-02-15

## 2017-02-15 DIAGNOSIS — Z98.89 OTHER SPECIFIED POSTPROCEDURAL STATES: Chronic | ICD-10-CM

## 2017-02-15 DIAGNOSIS — Z98.890 OTHER SPECIFIED POSTPROCEDURAL STATES: Chronic | ICD-10-CM

## 2017-02-15 PROBLEM — D86.9 SARCOIDOSIS, UNSPECIFIED: Chronic | Status: ACTIVE | Noted: 2017-01-25

## 2017-02-15 PROBLEM — R25.1 TREMOR, UNSPECIFIED: Chronic | Status: ACTIVE | Noted: 2017-01-25

## 2017-02-15 PROBLEM — E05.90 THYROTOXICOSIS, UNSPECIFIED WITHOUT THYROTOXIC CRISIS OR STORM: Chronic | Status: ACTIVE | Noted: 2017-01-25

## 2017-02-15 PROBLEM — R01.1 CARDIAC MURMUR, UNSPECIFIED: Chronic | Status: ACTIVE | Noted: 2017-01-25

## 2017-02-15 PROBLEM — M21.70 UNEQUAL LIMB LENGTH (ACQUIRED), UNSPECIFIED SITE: Chronic | Status: ACTIVE | Noted: 2017-01-25

## 2017-02-15 PROBLEM — D64.9 ANEMIA, UNSPECIFIED: Chronic | Status: ACTIVE | Noted: 2017-01-25

## 2017-02-15 PROCEDURE — 73501 X-RAY EXAM HIP UNI 1 VIEW: CPT

## 2017-02-15 PROCEDURE — 73501 X-RAY EXAM HIP UNI 1 VIEW: CPT | Mod: 26,RT

## 2017-02-15 RX ORDER — OXYCODONE AND ACETAMINOPHEN 5; 325 MG/1; MG/1
5-325 TABLET ORAL
Qty: 70 | Refills: 0 | Status: DISCONTINUED | COMMUNITY
Start: 2017-01-25 | End: 2017-02-15

## 2017-02-27 ENCOUNTER — CLINICAL ADVICE (OUTPATIENT)
Age: 62
End: 2017-02-27

## 2017-03-20 ENCOUNTER — APPOINTMENT (OUTPATIENT)
Dept: ORTHOPEDIC SURGERY | Facility: CLINIC | Age: 62
End: 2017-03-20

## 2017-03-20 RX ORDER — CELECOXIB 200 MG/1
200 CAPSULE ORAL TWICE DAILY
Qty: 84 | Refills: 0 | Status: DISCONTINUED | COMMUNITY
Start: 2017-01-25 | End: 2017-03-20

## 2017-03-20 RX ORDER — ASPIRIN/ACETAMINOPHEN/CAFFEINE 500-325-65
325 POWDER IN PACKET (EA) ORAL
Qty: 60 | Refills: 0 | Status: DISCONTINUED | COMMUNITY
Start: 2017-01-25 | End: 2017-03-20

## 2017-03-20 RX ORDER — PANTOPRAZOLE 40 MG/1
40 TABLET, DELAYED RELEASE ORAL DAILY
Qty: 30 | Refills: 0 | Status: DISCONTINUED | COMMUNITY
Start: 2017-01-25 | End: 2017-03-20

## 2017-03-29 ENCOUNTER — APPOINTMENT (OUTPATIENT)
Dept: INTERNAL MEDICINE | Facility: CLINIC | Age: 62
End: 2017-03-29

## 2017-03-29 VITALS
SYSTOLIC BLOOD PRESSURE: 110 MMHG | OXYGEN SATURATION: 97 % | BODY MASS INDEX: 15.85 KG/M2 | HEIGHT: 72 IN | DIASTOLIC BLOOD PRESSURE: 70 MMHG | HEART RATE: 78 BPM | WEIGHT: 117 LBS | TEMPERATURE: 97.8 F

## 2017-04-03 LAB
CHOLEST SERPL-MCNC: 202 MG/DL
CHOLEST/HDLC SERPL: 2.6 RATIO
HBA1C MFR BLD HPLC: 5.9 %
HCV AB SER QL: NONREACTIVE
HCV S/CO RATIO: 0.19 S/CO
HDLC SERPL-MCNC: 79 MG/DL
HIV1+2 AB SPEC QL IA.RAPID: NONREACTIVE
LDLC SERPL CALC-MCNC: 107 MG/DL
TRIGL SERPL-MCNC: 81 MG/DL

## 2017-04-12 ENCOUNTER — APPOINTMENT (OUTPATIENT)
Dept: ORTHOPEDIC SURGERY | Facility: CLINIC | Age: 62
End: 2017-04-12

## 2017-04-20 DIAGNOSIS — R92.8 OTHER ABNORMAL AND INCONCLUSIVE FINDINGS ON DIAGNOSTIC IMAGING OF BREAST: ICD-10-CM

## 2017-04-28 ENCOUNTER — OUTPATIENT (OUTPATIENT)
Dept: OUTPATIENT SERVICES | Facility: HOSPITAL | Age: 62
LOS: 1 days | End: 2017-04-28
Payer: COMMERCIAL

## 2017-04-28 DIAGNOSIS — Z98.890 OTHER SPECIFIED POSTPROCEDURAL STATES: Chronic | ICD-10-CM

## 2017-04-28 DIAGNOSIS — Z98.89 OTHER SPECIFIED POSTPROCEDURAL STATES: Chronic | ICD-10-CM

## 2017-04-28 PROBLEM — R92.8 ABNORMAL MAMMOGRAM: Status: ACTIVE | Noted: 2017-04-28

## 2017-04-28 PROCEDURE — G0204: CPT | Mod: 26

## 2017-04-28 PROCEDURE — 76641 ULTRASOUND BREAST COMPLETE: CPT | Mod: 26,50

## 2017-04-28 PROCEDURE — 76641 ULTRASOUND BREAST COMPLETE: CPT

## 2017-04-28 PROCEDURE — 77066 DX MAMMO INCL CAD BI: CPT

## 2017-05-15 ENCOUNTER — CLINICAL ADVICE (OUTPATIENT)
Age: 62
End: 2017-05-15

## 2017-05-15 RX ORDER — ALBUTEROL SULFATE 1.25 MG/3ML
1.25 SOLUTION RESPIRATORY (INHALATION)
Qty: 2 | Refills: 0 | Status: DISCONTINUED | COMMUNITY
Start: 2017-05-08 | End: 2017-05-15

## 2017-05-24 ENCOUNTER — RESULT REVIEW (OUTPATIENT)
Age: 62
End: 2017-05-24

## 2017-05-24 ENCOUNTER — OUTPATIENT (OUTPATIENT)
Dept: OUTPATIENT SERVICES | Facility: HOSPITAL | Age: 62
LOS: 1 days | End: 2017-05-24
Payer: COMMERCIAL

## 2017-05-24 DIAGNOSIS — Z98.890 OTHER SPECIFIED POSTPROCEDURAL STATES: Chronic | ICD-10-CM

## 2017-05-24 DIAGNOSIS — Z98.89 OTHER SPECIFIED POSTPROCEDURAL STATES: Chronic | ICD-10-CM

## 2017-05-24 PROCEDURE — G0206: CPT | Mod: 26,LT

## 2017-05-24 PROCEDURE — A4648: CPT

## 2017-05-24 PROCEDURE — 88305 TISSUE EXAM BY PATHOLOGIST: CPT

## 2017-05-24 PROCEDURE — 19083 BX BREAST 1ST LESION US IMAG: CPT

## 2017-05-24 PROCEDURE — 77065 DX MAMMO INCL CAD UNI: CPT

## 2017-05-24 PROCEDURE — 19083 BX BREAST 1ST LESION US IMAG: CPT | Mod: LT

## 2017-05-25 LAB — SURGICAL PATHOLOGY STUDY: SIGNIFICANT CHANGE UP

## 2017-06-21 ENCOUNTER — APPOINTMENT (OUTPATIENT)
Dept: GASTROENTEROLOGY | Facility: CLINIC | Age: 62
End: 2017-06-21

## 2017-06-21 ENCOUNTER — NON-APPOINTMENT (OUTPATIENT)
Age: 62
End: 2017-06-21

## 2017-06-21 VITALS
TEMPERATURE: 98 F | HEIGHT: 65 IN | DIASTOLIC BLOOD PRESSURE: 77 MMHG | BODY MASS INDEX: 19.49 KG/M2 | OXYGEN SATURATION: 98 % | SYSTOLIC BLOOD PRESSURE: 120 MMHG | RESPIRATION RATE: 14 BRPM | WEIGHT: 117 LBS | HEART RATE: 88 BPM

## 2017-06-22 LAB
ALBUMIN SERPL ELPH-MCNC: 3.9 G/DL
ALP BLD-CCNC: 80 U/L
ALT SERPL-CCNC: 11 U/L
ANION GAP SERPL CALC-SCNC: 17 MMOL/L
AST SERPL-CCNC: 23 U/L
BASOPHILS # BLD AUTO: 0.03 K/UL
BASOPHILS NFR BLD AUTO: 0.7 %
BILIRUB SERPL-MCNC: 0.7 MG/DL
BUN SERPL-MCNC: 19 MG/DL
CALCIUM SERPL-MCNC: 9.5 MG/DL
CHLORIDE SERPL-SCNC: 103 MMOL/L
CO2 SERPL-SCNC: 26 MMOL/L
CREAT SERPL-MCNC: 0.78 MG/DL
EOSINOPHIL # BLD AUTO: 0.16 K/UL
EOSINOPHIL NFR BLD AUTO: 3.5 %
GLUCOSE SERPL-MCNC: 95 MG/DL
HCT VFR BLD CALC: 37.4 %
HGB BLD-MCNC: 11.8 G/DL
IMM GRANULOCYTES NFR BLD AUTO: 0 %
LYMPHOCYTES # BLD AUTO: 2.85 K/UL
LYMPHOCYTES NFR BLD AUTO: 62.5 %
MAN DIFF?: NORMAL
MCHC RBC-ENTMCNC: 29.1 PG
MCHC RBC-ENTMCNC: 31.6 GM/DL
MCV RBC AUTO: 92.3 FL
MONOCYTES # BLD AUTO: 0.27 K/UL
MONOCYTES NFR BLD AUTO: 5.9 %
NEUTROPHILS # BLD AUTO: 1.25 K/UL
NEUTROPHILS NFR BLD AUTO: 27.4 %
PLATELET # BLD AUTO: 337 K/UL
POTASSIUM SERPL-SCNC: 4 MMOL/L
PROT SERPL-MCNC: 8.2 G/DL
RBC # BLD: 4.05 M/UL
RBC # FLD: 15.1 %
SODIUM SERPL-SCNC: 146 MMOL/L
WBC # FLD AUTO: 4.56 K/UL

## 2017-07-11 ENCOUNTER — OUTPATIENT (OUTPATIENT)
Dept: OUTPATIENT SERVICES | Facility: HOSPITAL | Age: 62
LOS: 1 days | Discharge: HOME | End: 2017-07-11
Payer: COMMERCIAL

## 2017-07-11 ENCOUNTER — APPOINTMENT (OUTPATIENT)
Dept: GASTROENTEROLOGY | Facility: HOSPITAL | Age: 62
End: 2017-07-11

## 2017-07-11 ENCOUNTER — RESULT REVIEW (OUTPATIENT)
Age: 62
End: 2017-07-11

## 2017-07-11 DIAGNOSIS — Z98.890 OTHER SPECIFIED POSTPROCEDURAL STATES: Chronic | ICD-10-CM

## 2017-07-11 DIAGNOSIS — Z98.89 OTHER SPECIFIED POSTPROCEDURAL STATES: Chronic | ICD-10-CM

## 2017-07-11 PROCEDURE — 45378 DIAGNOSTIC COLONOSCOPY: CPT

## 2017-07-11 PROCEDURE — 91035 G-ESOPH REFLX TST W/ELECTROD: CPT | Mod: 26

## 2017-07-11 PROCEDURE — 43239 EGD BIOPSY SINGLE/MULTIPLE: CPT

## 2017-07-11 PROCEDURE — 88305 TISSUE EXAM BY PATHOLOGIST: CPT

## 2017-07-12 LAB — SURGICAL PATHOLOGY STUDY: SIGNIFICANT CHANGE UP

## 2017-07-14 DIAGNOSIS — Z96.641 PRESENCE OF RIGHT ARTIFICIAL HIP JOINT: ICD-10-CM

## 2017-07-14 DIAGNOSIS — J45.909 UNSPECIFIED ASTHMA, UNCOMPLICATED: ICD-10-CM

## 2017-07-14 DIAGNOSIS — K44.9 DIAPHRAGMATIC HERNIA WITHOUT OBSTRUCTION OR GANGRENE: ICD-10-CM

## 2017-07-14 DIAGNOSIS — M19.90 UNSPECIFIED OSTEOARTHRITIS, UNSPECIFIED SITE: ICD-10-CM

## 2017-07-14 DIAGNOSIS — K21.0 GASTRO-ESOPHAGEAL REFLUX DISEASE WITH ESOPHAGITIS: ICD-10-CM

## 2017-07-19 ENCOUNTER — INPATIENT (INPATIENT)
Facility: HOSPITAL | Age: 62
LOS: 1 days | Discharge: HOME | DRG: 103 | End: 2017-07-21
Attending: PSYCHIATRY & NEUROLOGY | Admitting: PSYCHIATRY & NEUROLOGY
Payer: COMMERCIAL

## 2017-07-19 VITALS
SYSTOLIC BLOOD PRESSURE: 134 MMHG | OXYGEN SATURATION: 99 % | HEART RATE: 82 BPM | DIASTOLIC BLOOD PRESSURE: 95 MMHG | TEMPERATURE: 98 F | WEIGHT: 117.07 LBS | RESPIRATION RATE: 18 BRPM

## 2017-07-19 DIAGNOSIS — D86.9 SARCOIDOSIS, UNSPECIFIED: ICD-10-CM

## 2017-07-19 DIAGNOSIS — J45.909 UNSPECIFIED ASTHMA, UNCOMPLICATED: ICD-10-CM

## 2017-07-19 DIAGNOSIS — Z29.9 ENCOUNTER FOR PROPHYLACTIC MEASURES, UNSPECIFIED: ICD-10-CM

## 2017-07-19 DIAGNOSIS — Z98.89 OTHER SPECIFIED POSTPROCEDURAL STATES: Chronic | ICD-10-CM

## 2017-07-19 DIAGNOSIS — Z98.890 OTHER SPECIFIED POSTPROCEDURAL STATES: Chronic | ICD-10-CM

## 2017-07-19 DIAGNOSIS — E05.90 THYROTOXICOSIS, UNSPECIFIED WITHOUT THYROTOXIC CRISIS OR STORM: ICD-10-CM

## 2017-07-19 DIAGNOSIS — I63.9 CEREBRAL INFARCTION, UNSPECIFIED: ICD-10-CM

## 2017-07-19 LAB
ANION GAP SERPL CALC-SCNC: 13 MMOL/L — SIGNIFICANT CHANGE UP (ref 5–17)
APPEARANCE UR: CLEAR — SIGNIFICANT CHANGE UP
BILIRUB UR-MCNC: NEGATIVE — SIGNIFICANT CHANGE UP
BUN SERPL-MCNC: 19 MG/DL — SIGNIFICANT CHANGE UP (ref 7–23)
CALCIUM SERPL-MCNC: 9.4 MG/DL — SIGNIFICANT CHANGE UP (ref 8.4–10.5)
CHLORIDE SERPL-SCNC: 106 MMOL/L — SIGNIFICANT CHANGE UP (ref 96–108)
CO2 SERPL-SCNC: 23 MMOL/L — SIGNIFICANT CHANGE UP (ref 22–31)
COLOR SPEC: YELLOW — SIGNIFICANT CHANGE UP
CREAT SERPL-MCNC: 0.7 MG/DL — SIGNIFICANT CHANGE UP (ref 0.5–1.3)
DIFF PNL FLD: (no result)
GLUCOSE SERPL-MCNC: 86 MG/DL — SIGNIFICANT CHANGE UP (ref 70–99)
GLUCOSE UR QL: NEGATIVE — SIGNIFICANT CHANGE UP
HCT VFR BLD CALC: 34.5 % — SIGNIFICANT CHANGE UP (ref 34.5–45)
HGB BLD-MCNC: 11.6 G/DL — SIGNIFICANT CHANGE UP (ref 11.5–15.5)
KETONES UR-MCNC: NEGATIVE — SIGNIFICANT CHANGE UP
LEUKOCYTE ESTERASE UR-ACNC: NEGATIVE — SIGNIFICANT CHANGE UP
MCHC RBC-ENTMCNC: 29.1 PG — SIGNIFICANT CHANGE UP (ref 27–34)
MCHC RBC-ENTMCNC: 33.6 G/DL — SIGNIFICANT CHANGE UP (ref 32–36)
MCV RBC AUTO: 86.7 FL — SIGNIFICANT CHANGE UP (ref 80–100)
NITRITE UR-MCNC: NEGATIVE — SIGNIFICANT CHANGE UP
PH UR: 7 — SIGNIFICANT CHANGE UP (ref 5–8)
PLATELET # BLD AUTO: 278 K/UL — SIGNIFICANT CHANGE UP (ref 150–400)
POTASSIUM SERPL-MCNC: 3.8 MMOL/L — SIGNIFICANT CHANGE UP (ref 3.5–5.3)
POTASSIUM SERPL-SCNC: 3.8 MMOL/L — SIGNIFICANT CHANGE UP (ref 3.5–5.3)
PROT UR-MCNC: NEGATIVE MG/DL — SIGNIFICANT CHANGE UP
RBC # BLD: 3.98 M/UL — SIGNIFICANT CHANGE UP (ref 3.8–5.2)
RBC # FLD: 13.9 % — SIGNIFICANT CHANGE UP (ref 10.3–16.9)
SODIUM SERPL-SCNC: 142 MMOL/L — SIGNIFICANT CHANGE UP (ref 135–145)
SP GR SPEC: <=1.005 — SIGNIFICANT CHANGE UP (ref 1–1.03)
UROBILINOGEN FLD QL: 0.2 E.U./DL — SIGNIFICANT CHANGE UP
WBC # BLD: 4.3 K/UL — SIGNIFICANT CHANGE UP (ref 3.8–10.5)
WBC # FLD AUTO: 4.3 K/UL — SIGNIFICANT CHANGE UP (ref 3.8–10.5)

## 2017-07-19 PROCEDURE — 70496 CT ANGIOGRAPHY HEAD: CPT | Mod: 26

## 2017-07-19 PROCEDURE — 99222 1ST HOSP IP/OBS MODERATE 55: CPT

## 2017-07-19 PROCEDURE — 70498 CT ANGIOGRAPHY NECK: CPT | Mod: 26

## 2017-07-19 PROCEDURE — 93010 ELECTROCARDIOGRAM REPORT: CPT

## 2017-07-19 PROCEDURE — 99285 EMERGENCY DEPT VISIT HI MDM: CPT | Mod: 25

## 2017-07-19 PROCEDURE — 70450 CT HEAD/BRAIN W/O DYE: CPT | Mod: 26,59

## 2017-07-19 RX ORDER — ONDANSETRON 8 MG/1
4 TABLET, FILM COATED ORAL ONCE
Qty: 0 | Refills: 0 | Status: COMPLETED | OUTPATIENT
Start: 2017-07-19 | End: 2017-07-19

## 2017-07-19 RX ORDER — ASPIRIN/CALCIUM CARB/MAGNESIUM 324 MG
325 TABLET ORAL DAILY
Qty: 0 | Refills: 0 | Status: COMPLETED | OUTPATIENT
Start: 2017-07-19 | End: 2017-07-19

## 2017-07-19 RX ORDER — KETOROLAC TROMETHAMINE 30 MG/ML
15 SYRINGE (ML) INJECTION ONCE
Qty: 0 | Refills: 0 | Status: DISCONTINUED | OUTPATIENT
Start: 2017-07-19 | End: 2017-07-19

## 2017-07-19 RX ORDER — ATORVASTATIN CALCIUM 80 MG/1
80 TABLET, FILM COATED ORAL AT BEDTIME
Qty: 0 | Refills: 0 | Status: DISCONTINUED | OUTPATIENT
Start: 2017-07-20 | End: 2017-07-21

## 2017-07-19 RX ORDER — ASPIRIN/CALCIUM CARB/MAGNESIUM 324 MG
81 TABLET ORAL DAILY
Qty: 0 | Refills: 0 | Status: DISCONTINUED | OUTPATIENT
Start: 2017-07-20 | End: 2017-07-21

## 2017-07-19 RX ORDER — HEPARIN SODIUM 5000 [USP'U]/ML
5000 INJECTION INTRAVENOUS; SUBCUTANEOUS EVERY 8 HOURS
Qty: 0 | Refills: 0 | Status: DISCONTINUED | OUTPATIENT
Start: 2017-07-19 | End: 2017-07-21

## 2017-07-19 RX ORDER — ATORVASTATIN CALCIUM 80 MG/1
80 TABLET, FILM COATED ORAL AT BEDTIME
Qty: 0 | Refills: 0 | Status: DISCONTINUED | OUTPATIENT
Start: 2017-07-19 | End: 2017-07-19

## 2017-07-19 RX ORDER — ATORVASTATIN CALCIUM 80 MG/1
80 TABLET, FILM COATED ORAL AT BEDTIME
Qty: 0 | Refills: 0 | Status: COMPLETED | OUTPATIENT
Start: 2017-07-19 | End: 2017-07-19

## 2017-07-19 RX ORDER — IPRATROPIUM/ALBUTEROL SULFATE 18-103MCG
3 AEROSOL WITH ADAPTER (GRAM) INHALATION EVERY 6 HOURS
Qty: 0 | Refills: 0 | Status: DISCONTINUED | OUTPATIENT
Start: 2017-07-19 | End: 2017-07-21

## 2017-07-19 RX ORDER — ONDANSETRON 8 MG/1
4 TABLET, FILM COATED ORAL EVERY 6 HOURS
Qty: 0 | Refills: 0 | Status: DISCONTINUED | OUTPATIENT
Start: 2017-07-19 | End: 2017-07-21

## 2017-07-19 RX ORDER — ACETAMINOPHEN 500 MG
650 TABLET ORAL EVERY 6 HOURS
Qty: 0 | Refills: 0 | Status: DISCONTINUED | OUTPATIENT
Start: 2017-07-19 | End: 2017-07-21

## 2017-07-19 RX ADMIN — Medication 15 MILLIGRAM(S): at 23:40

## 2017-07-19 RX ADMIN — Medication 325 MILLIGRAM(S): at 19:58

## 2017-07-19 RX ADMIN — Medication 15 MILLIGRAM(S): at 19:12

## 2017-07-19 RX ADMIN — Medication 15 MILLIGRAM(S): at 23:22

## 2017-07-19 RX ADMIN — ATORVASTATIN CALCIUM 80 MILLIGRAM(S): 80 TABLET, FILM COATED ORAL at 19:58

## 2017-07-19 RX ADMIN — HEPARIN SODIUM 5000 UNIT(S): 5000 INJECTION INTRAVENOUS; SUBCUTANEOUS at 22:56

## 2017-07-19 RX ADMIN — Medication 15 MILLIGRAM(S): at 18:49

## 2017-07-19 NOTE — H&P ADULT - PROBLEM SELECTOR PLAN 1
Pt with initial symptoms concerning for acute stroke. CTA head/neck negative. Pt with headache but no focal findings on exam.  - mg, lipitor 80 mg STAT.   -MRI Brain w/o con  -PT/OT consult   -Dysphagia screen   -HbA1c, lipid panel, TSH,  -f/u CBC, CMP, coag  -EKG  -Echo with bubble study  Cardiac monitoring  Permissive hypertension up to 220/120 for first 24-36 hours  IV NS 1L 75 cc Pt with initial symptoms concerning for acute stroke. CTA head/neck negative. Pt with headache but no focal findings on exam. Clinical picture likely due to migraine headaches with aura.  - mg, lipitor 80 mg STAT.   -MRI Brain w/o con  -PT/OT consult   -Dysphagia screen   -HbA1c, lipid panel, TSH,  -f/u CBC, CMP, coag  -EKG  -Echo with bubble study  -Cardiac monitoring  -Permissive hypertension up to 220/120 for first 24-36 hours  -IV NS 1L 75 cc  -Stroke educator consult Pt with initial symptoms concerning for acute stroke. CTA head/neck negative. Pt with headache but no focal findings on exam. Clinical picture likely due to migraine headaches with aura however atypical due to later age of onset. Temporal arteritis unlikely although patient fits the age demographic. Greater occipital nerve necropathy lower in the differential given lack of posterior neck pain to palpation    - mg, lipitor 80 mg STAT.   -MRI Brain w/o con  -PT/OT consult   -Dysphagia screen   -HbA1c, lipid panel, TSH,  -f/u CBC, CMP, coag  -EKG  -Echo with bubble study  -Cardiac monitoring  -Permissive hypertension up to 220/120 for first 24-36 hours  -IV NS 1L 75 cc  -Stroke educator consult  -ESR for concern of possible temporal arteritis Pt with initial symptoms concerning for acute stroke. CTA head/neck negative. Pt with headache but no focal findings on exam. Clinical picture likely due to migraine headaches with aura however atypical due to later age of onset. Temporal arteritis unlikely although patient fits the age demographic and reports her mouth getting tired with eating. Greater occipital nerve necropathy lower in the differential given lack of posterior neck pain to palpation    - mg, lipitor 80 mg STAT.   -MRI Brain w/o con  -PT/OT consult   -Dysphagia screen   -HbA1c, lipid panel, TSH,  -f/u CBC, CMP, coag  -EKG  -Echo with bubble study  -Cardiac monitoring  -Permissive hypertension up to 220/120 for first 24-36 hours  -IV NS 1L 75 cc  -Stroke educator consult  -ESR for concern of possible temporal arteritis Pt with initial symptoms concerning for acute stroke. CTA head/neck negative. Pt with headache but no focal findings on exam. Clinical picture likely due to migraine headaches with aura however atypical due to later age of onset. Temporal arteritis unlikely although patient fits the age demographic and reports her mouth getting tired with eating. Greater occipital nerve necropathy lower in the differential given lack of posterior neck pain to palpation    -Toradol 15 mg IV push once for headache, Zofran 4 mg IV push once for nausea   - mg, lipitor 80 mg STAT.   -MRI Brain w/o con  -PT/OT consult   -Dysphagia screen   -HbA1c, lipid panel, TSH,  -f/u CBC, CMP, coag  -EKG  -Echo with bubble study  -Cardiac monitoring  -Permissive hypertension up to 220/120 for first 24-36 hours  -IV NS 1L 75 cc  -Stroke educator consult  -ESR for concern of possible temporal arteritis

## 2017-07-19 NOTE — ED PROVIDER NOTE - NIH STROKE SCALE: 5A. MOTOR ARM, LEFT, QM
(0) No drift; limb holds 90 (or 45) degrees for full 10 secs (1) Drift; limb holds 90 (or 45) degrees, but drifts down before full 10 seconds; does not hit bed or other support

## 2017-07-19 NOTE — ED ADULT TRIAGE NOTE - CHIEF COMPLAINT QUOTE
Patient brought by EMS from street. Says was shopping and started to have changes in vision to her left eye with dizziness, left arm pain and tingling to lips, tongue and left arm. All of this lasted for 15 minutes. Has not eaten today, only has had cranberry juice. Glucose 86.

## 2017-07-19 NOTE — H&P ADULT - NSHPSOCIALHISTORY_GEN_ALL_CORE
Pt is  with two kids (39 and 32). Never a smoker, drinker or recreational drug user. Not currently sexually active. She works as a . Her sister Comfort Lui  should be the one making medical decision on her behalf should something happen to her.

## 2017-07-19 NOTE — CONSULT NOTE ADULT - ASSESSMENT
63 yo F p/w acute onset L arm weakness and perioral numbness w/ transient L eye blindness, all symptoms now resolved, CT head negative for acute pathology.

## 2017-07-19 NOTE — ED PROVIDER NOTE - CHPI ED SYMPTOMS NEG
no nausea/no weakness/no dizziness/no fever/no loss of consciousness/no confusion/no change in level of consciousness/no numbness/no vomiting

## 2017-07-19 NOTE — H&P ADULT - NSHPPHYSICALEXAM_GEN_ALL_CORE
Constitutional: Slim body habitus, appears in distress from severe headache,   Head: NC/AT  Eyes: Limited due to photophobia. PERRL, EOMI, sclera injection, no mucosal pallor  ENT: no nasal discharge; uvula midline, no oropharyngeal erythema or exudates; MMM  Neck: supple; no JVD or thyromegaly, no lymphadenopathy  Respiratory: CTA B/L; no W/R/R, no retractions  Cardiac: +S1/S2; RRR; no M/R/G; PMI non-displaced  Gastrointestinal: abdomen soft, NT/ND; no rebound or guarding; +BSx4  Back: spine midline, no bony tenderness or step-offs; no CVAT B/L  Extremities: warm; no calf tenderness, no pedal edema, no cyanosis, no clubbing  Musculoskeletal: NROM x4; no joint swelling, tenderness or erythema  Vascular: 2+ radial, femoral; DP/PT pulses B/L  Dermatologic: no rashe, no petichia   Lymphatic: no submandibular or cervical LAD  Neurologic: AAOx3; CNII-XII grossly intact; sensory symmetrically intact in B/L LE   Psychiatric: pleasant and conversive; affect and characteristics of appearance, verbalizations, behaviors are appropriate Constitutional: Slim body habitus, appears in distress from severe headache,   Head: NC/AT  Eyes: Limited due to photophobia. PERRL, EOMI, sclera injection, watery eyes, no mucosal pallor  ENT: no nasal discharge; uvula midline, no oropharyngeal erythema or exudates; MMM, prominent hard palate   Neck: supple; no JVD or thyromegaly, no lymphadenopathy  Respiratory: CTA B/L; no W/R/R, no retractions  Cardiac: +S1/S2; RRR; no M/R/G  Gastrointestinal: abdomen soft, NT/ND; no rebound or guarding; +BSx4  Back: spine midline, no bony tenderness or step-offs; no CVAT B/L  Extremities: warm; no calf tenderness, no pedal edema, no cyanosis, no clubbing  Musculoskeletal: NROM x4, pain limited; no joint swelling, tenderness or erythema  Vascular: 2+ radial, DP/PT pulses B/L  Dermatologic: no rashes, no petechia   Lymphatic: no submandibular or cervical LAD  Neurologic: AAOx3; CNII-XII grossly intact; sensory symmetrically intact in B/L LE Constitutional: Slim body habitus, appears in distress from severe headache,   Head: NC/AT  Eyes: Limited due to photophobia. PERRL, EOMI, sclera injection, watery eyes, no mucosal pallor  ENT: no nasal discharge; uvula midline, no oropharyngeal erythema or exudates; MMM, prominent hard palate   Neck: supple; no JVD or thyromegaly, no lymphadenopathy  Respiratory: CTA B/L; no W/R/R, no retractions  Cardiac: +S1/S2; RRR; no M/R/G  Gastrointestinal: abdomen soft, NT/ND; no rebound or guarding; +BSx4  Back: spine midline, no bony tenderness or step-offs; no CVAT B/L  Extremities: warm; no calf tenderness, no pedal edema, no cyanosis, no clubbing  Musculoskeletal: NROM x4, pain limited; no joint swelling, tenderness or erythema  Vascular: 2+ radial, DP/PT pulses B/L  Dermatologic: no rashes, no petechia   Lymphatic: no submandibular or cervical LAD  Neurologic: AAOx3; CNII-XII grossly intact; sensory symmetrically intact in B/L LE. No pronator drift Constitutional: Slim body habitus, appears in distress from severe headache,   Head: NC/AT  Eyes: Limited due to photophobia. PERRL, EOMI, sclera injection, watery eyes, no mucosal pallor  ENT: no nasal discharge; uvula midline, no oropharyngeal erythema or exudates; MMM, prominent hard palate   Neck: supple; no JVD or thyromegaly, no lymphadenopathy. No posterior cervical tenderness to palpation  Respiratory: CTA B/L; no W/R/R, no retractions  Cardiac: +S1/S2; RRR; no M/R/G  Gastrointestinal: abdomen soft, NT/ND; no rebound or guarding; +BSx4  Back: spine midline, no bony tenderness or step-offs; no CVAT B/L  Extremities: warm; no calf tenderness, no pedal edema, no cyanosis, no clubbing  Musculoskeletal: NROM x4, pain limited; no joint swelling, tenderness or erythema  Vascular: 2+ radial, DP/PT pulses B/L  Dermatologic: no rashes, no petechia   Lymphatic: no submandibular or cervical LAD  Neurologic: AAOx3; CNII-XII grossly intact; sensory symmetrically intact in B/L LE. No pronator drift

## 2017-07-19 NOTE — ED PROVIDER NOTE - OBJECTIVE STATEMENT
63 yo asthma with L eye blurred vision, tongue heaviness, L upper ext numbness. states s ymptoms have resolved now. no slurred sppech, extremity weakness, chest pain, sob, headache, fever, recent illnesses. has had prior episodes in the past which have resolved. 63 yo asthma with L eye blurred vision, tongue heaviness, L upper ext numbness. states symptoms have resolved now. no slurred speech, extremity weakness, chest pain, sob, headache, fever, recent illnesses. has had prior episodes in the past which have resolved. 61 yo asthma with L eye blurred vision, tongue heaviness, L upper ext numbness that started at 12p. states symptoms have resolved now. no slurred speech, extremity weakness, chest pain, sob, headache, fever, recent illnesses. has had prior episodes in the past also.

## 2017-07-19 NOTE — ED PROVIDER NOTE - PHYSICAL EXAMINATION
CONSTITUTIONAL: Well appearing, well nourished, awake, alert and in no apparent distress.  HEENT: Head is atraumatic. Eyes clear bilaterally, normal EOMI. Airway patent.  CARDIAC: Normal rate, regular rhythm.  Heart sounds S1, S2.   RESPIRATORY: Breath sounds clear and equal bilaterally.  GASTROINTESTINAL: Abdomen soft, non-tender, no guarding.  MUSCULOSKELETAL: Spine appears normal, range of motion is not limited, no muscle or joint tenderness.   NEUROLOGICAL: Alert and oriented, no focal deficits, no motor or sensory deficits.  SKIN: Skin normal color for race, warm, dry and intact. No evidence of rash.  PSYCHIATRIC: Alert and oriented to person, place, time/situation. normal mood and affect. no apparent risk to self or others.

## 2017-07-19 NOTE — ED ADULT NURSE NOTE - OBJECTIVE STATEMENT
Pt presents to ER c/o one episode of visual changes in her left eye that lasted approx 1/2 hr with numbness/tingling down her left arm and 6/10 frontal HA. PT stated this has happened to her two other times. Pt has facial symmetry, normal sensation face/UE/LE, strong equal hand grasp bilat, PERRL, speaking in clear full sentences. Pt denies CP, SOB, N/V/D, fevers. Pt stated she has not eaten today, FS 86 in triage. Pt placed on cardiac monitor, will maintain safety.

## 2017-07-19 NOTE — H&P ADULT - HISTORY OF PRESENT ILLNESS
61 yo F with PMH asthma, hyperthyroidism,cardiac murmur, anemia, presented to the ED with L eye blurred vision, tongue heaviness, L upper ext numbness. Pt states that she was at work, stepped outside to buy something and felt this intense headache. She has had a headache for the past week, that worsened today. The headache is 7/10, constant, bifrontal, heavy weight feeling. She had some flashes on light in her L eye, which went blind, followed by numbness and tingling in left arm and her lips. She reports photophobia but no sonophobia. She has no family Hx of migraines and has had no prior episodes in the past. She was told to go to the ED by  her coworkers. Currently pt denies any extremity weakness, chest pain, sob, headache, changes in bowel movement, urinary symptoms fever/chills, nuchal rigidity, or recent illnesses.    Of note pt underwent an upper endoscopy 1 week PTA because of coughing, she was found to have "2  hernia in the stomach and some inflammation 61 yo F with PMH asthma, hyperthyroidism,cardiac murmur, anemia, presented to the ED with L eye blurred vision, tongue heaviness, L upper ext numbness. Pt states that she was at work, stepped outside to buy something and felt this intense headache. She has had a headache for the past week, that worsened today. The headache is 7/10, constant, bifrontal, heavy weight feeling. She had some flashes on light in her L eye, which went blind, followed by numbness and tingling in left arm and her lips. She reports photophobia but no sonophobia. She took her prescribed pain meds which improved her hip pain but did not help with her headache. She has no family Hx of migraines and has had no prior episodes in the past. She was told to go to the ED by  her coworkers. Currently pt denies any extremity weakness, chest pain, sob, headache, changes in bowel movement, urinary symptoms fever/chills, nuchal rigidity, or recent illnesses.    Of note pt underwent an upper endoscopy 1 week PTA because of coughing, she was found to have "2  hernia in the stomach and some inflammation    ED: T 98.2, HR 71, /77 RR 18 SpO2 100% on RA. CBC, CMP, coags, cardiac markers, UA unremarkable. CTA neck normal, CTA head with no acute intracranial hemorrhage, mass effect or CT evidence of recent transcortical infarction. Mild microangiopathic disease. 63 yo F with PMH asthma, hyperthyroidism,cardiac murmur, anemia, presented to the ED with L eye blurred vision, tongue heaviness, L upper ext numbness. Pt states that she was at work, stepped outside to buy something and felt this intense headache. She has had a headache for the past week, that worsened today. The headache is 7/10, constant, bifrontal, heavy weight feeling. She had some flashes on light in her L eye, which went blind, followed by numbness and tingling in left arm and her lips. She reports photophobia but no sonophobia. She took her prescribed pain meds which improved her hip pain but did not help with her headache. She has no family Hx of migraines and has had no prior episodes in the past. She was told to go to the ED by  her coworkers. Currently pt denies any extremity weakness, chest pain, sob, headache, changes in bowel movement, urinary symptoms fever/chills, nuchal rigidity, or recent illnesses.    Of note pt underwent an upper endoscopy 1 week PTA because of coughing, she was found to have "2  hernia in the stomach and some inflammation    ED: T 98.2, HR 71, /77 RR 18 SpO2 100% on RA. CBC, CMP, coags, cardiac markers, UA unremarkable. NIH stroke scale of 2. CTA neck normal, CTA head with no acute intracranial hemorrhage, mass effect or CT evidence of recent transcortical infarction. Mild microangiopathic disease. 61 yo F with PMH asthma, hyperthyroidism,cardiac murmur, anemia, presented to the ED with L eye blurred vision, tongue heaviness, L upper ext numbness. Pt states that she was at work, stepped outside to buy something and felt this intense headache. She has had a headache for the past week, that worsened today. The headache is 7/10, constant, bifrontal, heavy weight feeling. She had some flashes on light in her L eye, which went blind, followed by numbness and tingling in left arm and her lips. She reports photophobia but no sonophobia. She took her prescribed pain meds which improved her hip pain but did not help with her headache. She has no family Hx of migraines and has had no prior episodes in the past. She was told to go to the ED by  her coworkers. Currently pt denies any extremity weakness, chest pain, sob, headache, changes in bowel movement, urinary symptoms fever/chills, nuchal rigidity, or recent illnesses. She has had no jaw claudication or proximal muscle weakness.    Of note pt underwent an upper endoscopy 1 week PTA because of coughing, she was found to have "2  hernia in the stomach and some inflammation    ED: T 98.2, HR 71, /77 RR 18 SpO2 100% on RA. CBC, CMP, coags, cardiac markers, UA unremarkable. NIH stroke scale of 2. CTA neck normal, CTA head with no acute intracranial hemorrhage, mass effect or CT evidence of recent transcortical infarction. Mild microangiopathic disease. 63 yo F with PMH asthma, hyperthyroidism,cardiac murmur, anemia, presented to the ED with L eye blurred vision, tongue heaviness, L upper ext numbness. Pt states that she was at work, stepped outside to buy something and felt this intense headache. She has had a headache for the past week, that worsened today. The headache is 7/10, constant, bifrontal, heavy weight feeling. She had some flashes on light in her L eye, which went blind, followed by numbness and tingling in left arm and her lips. She reports photophobia but no sonophobia. She took her prescribed pain meds which improved her hip pain but did not help with her headache. She has no family Hx of migraines and has had no prior episodes in the past. She was told to go to the ED by  her coworkers. Currently pt denies any extremity weakness, chest pain, sob, headache, changes in bowel movement, urinary symptoms fever/chills, nuchal rigidity, or recent illnesses. She has had some jaw claudication described as "her mouth getting tired when she eats" for the past 6 months, but no muscle weakness reported    Of note pt underwent an upper endoscopy 1 week PTA because of coughing, she was found to have "2  hernia in the stomach and some inflammation    ED: T 98.2, HR 71, /77 RR 18 SpO2 100% on RA. CBC, CMP, coags, cardiac markers, UA unremarkable. NIH stroke scale of 2. CTA neck normal, CTA head with no acute intracranial hemorrhage, mass effect or CT evidence of recent transcortical infarction. Mild microangiopathic disease.

## 2017-07-19 NOTE — ED PROVIDER NOTE - MEDICAL DECISION MAKING DETAILS
s/s as above, stroke code called after initial evaluation.  discussed with stroke team- given inconsistencies on exam, symptoms resolved, no tpa. however, given repeat episode, will admit to regional for stroke work up. no other cp/sob.

## 2017-07-19 NOTE — CONSULT NOTE ADULT - PROBLEM SELECTOR RECOMMENDATION 9
TIA vs migraine HA.   --  mg, lipitor 80 mg  -- f/u MRI Brain w/o con  -- PT/OT consult    -- f/u HbA1c, lipid panel, TSH  -- Echo with bubble study  -- Allow for Permissive hypertension up to 220/120 for first 24-36 hours  -- Admit as inpt for further monitoring and care TIA vs migraine HA vs amaurosis fugax  --  mg, lipitor 80 mg  -- f/u MRI Brain w/o con  -- PT/OT consult    -- f/u HbA1c, lipid panel, TSH  -- Echo with bubble study  -- Carotid US  -- Allow for Permissive hypertension up to 220/120 for first 24-36 hours  -- Admit as inpt for further monitoring and care

## 2017-07-19 NOTE — H&P ADULT - NSHPREVIEWOFSYSTEMS_GEN_ALL_CORE
CV: Denies chest pain, palpitations  Resp: Denies SOB  GI: Denies abdominal pain, constipation, diarrhea, nausea, vomiting  : Denies dysuria, hematuria, flank or back pain  ID: Denies fevers, chills  MSK: Denies joint pain   ENDO: Denies any recent changes in weight, or cold intolerance  DERM: Denies any rashes, petechia or bruises  PSYCH: Denies any mood changes

## 2017-07-19 NOTE — H&P ADULT - ASSESSMENT
61 yo F with PMH of asthma, thyroidism, anxiety, p/w intense headache, photophobia and paresthesia of the hands and lips; s/p negative CTA head and neck due to concern for stroke. 61 yo F with PMH of asthma, hyperthyroidism, anxiety, p/w intense headache, photophobia and paresthesia of the hands and lips; s/p negative CTA head and neck due to concern for stroke.

## 2017-07-19 NOTE — H&P ADULT - NSHPLABSRESULTS_GEN_ALL_CORE
11.6   4.3   )-----------( 278      ( 19 Jul 2017 14:11 )             34.5   07-19    142  |  106  |  19  ----------------------------<  86  3.8   |  23  |  0.70    Ca    9.4      19 Jul 2017 14:11    TPro  7.6  /  Alb  3.7  /  TBili  1.1  /  DBili  <0.2  /  AST  21  /  ALT  12  /  AlkPhos  72  07-19    PT/INR - ( 19 Jul 2017 14:35 )   PT: 12.6 sec;   INR: 1.13     PTT - ( 19 Jul 2017 14:35 )  PTT:32.4 sec    CARDIAC MARKERS ( 19 Jul 2017 14:11 )  x     / <0.01 ng/mL / 149 U/L / x     / 2.5 ng/mL    Urinalysis Basic - ( 19 Jul 2017 16:54 )    Color: Yellow / Appearance: Clear / SG: <=1.005 / pH: x  Gluc: x / Ketone: NEGATIVE  / Bili: NEGATIVE / Urobili: 0.2 E.U./dL   Blood: x / Protein: NEGATIVE mg/dL / Nitrite: NEGATIVE   Leuk Esterase: NEGATIVE / RBC: < 5 /HPF / WBC < 5 /HPF   Sq Epi: x / Non Sq Epi: Rare /HPF / Bacteria: Present /HPF      < from: CT Angio Neck w/ IV Cont (07.19.17 @ 14:34) >  IMPRESSION: Normal CTA of the neck.    < from: CT Brain Stroke Protocol (07.19.17 @ 14:24) >  IMPRESSION:   No acute intracranial hemorrhage, mass effect or CT evidence of recent   transcortical infarction.   Mild microangiopathic disease.

## 2017-07-20 ENCOUNTER — TRANSCRIPTION ENCOUNTER (OUTPATIENT)
Age: 62
End: 2017-07-20

## 2017-07-20 DIAGNOSIS — K21.9 GASTRO-ESOPHAGEAL REFLUX DISEASE WITHOUT ESOPHAGITIS: ICD-10-CM

## 2017-07-20 LAB
ALBUMIN SERPL ELPH-MCNC: 3.9 G/DL — SIGNIFICANT CHANGE UP (ref 3.3–5)
ALP SERPL-CCNC: 73 U/L — SIGNIFICANT CHANGE UP (ref 40–120)
ALT FLD-CCNC: 12 U/L — SIGNIFICANT CHANGE UP (ref 10–45)
ANION GAP SERPL CALC-SCNC: 12 MMOL/L — SIGNIFICANT CHANGE UP (ref 5–17)
APTT BLD: 34.2 SEC — SIGNIFICANT CHANGE UP (ref 27.5–37.4)
AST SERPL-CCNC: 19 U/L — SIGNIFICANT CHANGE UP (ref 10–40)
BILIRUB SERPL-MCNC: 1.6 MG/DL — HIGH (ref 0.2–1.2)
BUN SERPL-MCNC: 16 MG/DL — SIGNIFICANT CHANGE UP (ref 7–23)
CALCIUM SERPL-MCNC: 9.1 MG/DL — SIGNIFICANT CHANGE UP (ref 8.4–10.5)
CHLORIDE SERPL-SCNC: 103 MMOL/L — SIGNIFICANT CHANGE UP (ref 96–108)
CHOLEST SERPL-MCNC: 203 MG/DL — HIGH (ref 10–199)
CO2 SERPL-SCNC: 24 MMOL/L — SIGNIFICANT CHANGE UP (ref 22–31)
CREAT SERPL-MCNC: 0.8 MG/DL — SIGNIFICANT CHANGE UP (ref 0.5–1.3)
GLUCOSE SERPL-MCNC: 96 MG/DL — SIGNIFICANT CHANGE UP (ref 70–99)
HBA1C BLD-MCNC: 5.7 % — HIGH (ref 4–5.6)
HCT VFR BLD CALC: 36.6 % — SIGNIFICANT CHANGE UP (ref 34.5–45)
HDLC SERPL-MCNC: 82 MG/DL — SIGNIFICANT CHANGE UP (ref 40–125)
HGB BLD-MCNC: 11.7 G/DL — SIGNIFICANT CHANGE UP (ref 11.5–15.5)
INR BLD: 1.16 — SIGNIFICANT CHANGE UP (ref 0.88–1.16)
LIPID PNL WITH DIRECT LDL SERPL: 105 MG/DL — SIGNIFICANT CHANGE UP
MCHC RBC-ENTMCNC: 28.5 PG — SIGNIFICANT CHANGE UP (ref 27–34)
MCHC RBC-ENTMCNC: 32 G/DL — SIGNIFICANT CHANGE UP (ref 32–36)
MCV RBC AUTO: 89.1 FL — SIGNIFICANT CHANGE UP (ref 80–100)
PLATELET # BLD AUTO: 293 K/UL — SIGNIFICANT CHANGE UP (ref 150–400)
POTASSIUM SERPL-MCNC: 3.7 MMOL/L — SIGNIFICANT CHANGE UP (ref 3.5–5.3)
POTASSIUM SERPL-SCNC: 3.7 MMOL/L — SIGNIFICANT CHANGE UP (ref 3.5–5.3)
PROT SERPL-MCNC: 7.3 G/DL — SIGNIFICANT CHANGE UP (ref 6–8.3)
PROTHROM AB SERPL-ACNC: 12.9 SEC — HIGH (ref 9.8–12.7)
RBC # BLD: 4.11 M/UL — SIGNIFICANT CHANGE UP (ref 3.8–5.2)
RBC # FLD: 13.6 % — SIGNIFICANT CHANGE UP (ref 10.3–16.9)
SODIUM SERPL-SCNC: 139 MMOL/L — SIGNIFICANT CHANGE UP (ref 135–145)
TOTAL CHOLESTEROL/HDL RATIO MEASUREMENT: 2.5 RATIO — LOW (ref 3.3–7.1)
TRIGL SERPL-MCNC: 78 MG/DL — SIGNIFICANT CHANGE UP (ref 10–149)
TSH SERPL-MCNC: 0.45 UIU/ML — SIGNIFICANT CHANGE UP (ref 0.35–4.94)
WBC # BLD: 6.8 K/UL — SIGNIFICANT CHANGE UP (ref 3.8–10.5)
WBC # FLD AUTO: 6.8 K/UL — SIGNIFICANT CHANGE UP (ref 3.8–10.5)

## 2017-07-20 PROCEDURE — 70551 MRI BRAIN STEM W/O DYE: CPT | Mod: 26

## 2017-07-20 PROCEDURE — 99232 SBSQ HOSP IP/OBS MODERATE 35: CPT

## 2017-07-20 PROCEDURE — 93306 TTE W/DOPPLER COMPLETE: CPT | Mod: 26

## 2017-07-20 RX ORDER — HYDROCORTISONE 1 %
1 OINTMENT (GRAM) TOPICAL DAILY
Qty: 0 | Refills: 0 | Status: DISCONTINUED | OUTPATIENT
Start: 2017-07-20 | End: 2017-07-21

## 2017-07-20 RX ORDER — IBUPROFEN 200 MG
600 TABLET ORAL EVERY 4 HOURS
Qty: 0 | Refills: 0 | Status: DISCONTINUED | OUTPATIENT
Start: 2017-07-20 | End: 2017-07-21

## 2017-07-20 RX ORDER — PANTOPRAZOLE SODIUM 20 MG/1
40 TABLET, DELAYED RELEASE ORAL
Qty: 0 | Refills: 0 | Status: DISCONTINUED | OUTPATIENT
Start: 2017-07-20 | End: 2017-07-21

## 2017-07-20 RX ADMIN — PANTOPRAZOLE SODIUM 40 MILLIGRAM(S): 20 TABLET, DELAYED RELEASE ORAL at 10:27

## 2017-07-20 RX ADMIN — HEPARIN SODIUM 5000 UNIT(S): 5000 INJECTION INTRAVENOUS; SUBCUTANEOUS at 22:25

## 2017-07-20 RX ADMIN — HEPARIN SODIUM 5000 UNIT(S): 5000 INJECTION INTRAVENOUS; SUBCUTANEOUS at 14:39

## 2017-07-20 RX ADMIN — Medication 81 MILLIGRAM(S): at 11:34

## 2017-07-20 RX ADMIN — HEPARIN SODIUM 5000 UNIT(S): 5000 INJECTION INTRAVENOUS; SUBCUTANEOUS at 06:07

## 2017-07-20 RX ADMIN — Medication 650 MILLIGRAM(S): at 16:16

## 2017-07-20 RX ADMIN — Medication 1 APPLICATION(S): at 17:27

## 2017-07-20 RX ADMIN — Medication 650 MILLIGRAM(S): at 15:44

## 2017-07-20 RX ADMIN — ATORVASTATIN CALCIUM 80 MILLIGRAM(S): 80 TABLET, FILM COATED ORAL at 22:26

## 2017-07-20 NOTE — DISCHARGE NOTE ADULT - HOSPITAL COURSE
Patient was admitted on 7/20 with severe bilateral frontal headache, left arm parasthesias, tongue heaviness and blurry vision in the left eye. Stroke code was called and she was evaluated with NIH stroke scale and head CT, both of which showed no evidence of acute stroke. Patient underwent cardiac echo as part of stroke workup which showed normal left ventricular ejection fraction (60-65%) and no evidence of interatrial shunt. MRI SHOWED Her original symptoms resolved with the exception of her headache which improved significantly throughout her hospital stay. Patient was admitted on 7/20 with severe bilateral frontal headache, left arm parasthesias, tongue heaviness and blurry vision in the left eye. Stroke code was called and she was evaluated with NIH stroke scale and head CT, both of which showed no evidence of acute stroke. Patient underwent cardiac echo as part of stroke workup which showed normal left ventricular ejection fraction (60-65%) and no evidence of interatrial shunt. MRI showed no evidence of acute stroke, chronic white matter microangiopathic ischemic disease.  Her original symptoms resolved and the patient is clinically fit for discharge. She was instructed to follow up with her primary care provider and provided with information to see Dr. Rutherford outpatient if she wishes.

## 2017-07-20 NOTE — PROGRESS NOTE ADULT - SUBJECTIVE AND OBJECTIVE BOX
Stroke Neurology Follow-Up Visit    Admitted to [address] [place] [address] yesterday night.   Pt seen and examined.   Reports doing better compared to yesterday.  Does still have headache but improved, currently 3/10 frontal headache with eye pressure bilaterally, similar characteristic as on admission. Denies feeling congested.   Worked with physical therapy today. No dizziness today upon rest. Had a second of lightheadedness when going from sitting to standing position. No dizziness with ambulation.   No nausea, vomiting, weakness, numbness, or unsteadiness of gait.     Exam:  T(F): 97.2 (07-20-17 @ 10:24), Max: 98.2 (07-19-17 @ 13:48)  HR: 84 (07-20-17 @ 11:10) (70 - 84)  BP: 117/80 (07-20-17 @ 11:10) (101/69 - 144/91)  RR: 16 (07-20-17 @ 10:24) (16 - 18)  SpO2: 98% (07-20-17 @ 11:10) (98% - 100%)    Gen: Sitting up in chair, calm, cooperative, pleasant, in NAD  Neuro:  Mental status: Awake, alert and oriented x3. Follows all commands. Recent and remote memory intact.  Naming, repetition and comprehension intact.  Attention/concentration intact.  No dysarthria, no aphasia.      Cranial nerves: Pupils equally round and reactive to light, 3 mm, visual fields full, no nystagmus, no ptosis, extraocular muscles intact, V1 through V3 intact bilaterally and symmetric, no facial droop, hearing intact to finger rub, palate elevation symmetric, tongue was midline, sternocleidomastoid/shoulder shrug strength bilaterally 5/5.    Motor:  No pronator drift. Normal bulk and tone, strength 5/5 in bilateral upper and lower extremities.   strength strong bilaterally.    Sensation: Intact and symmetric to light touch.  No neglect.   Coordination: No dysmetria on finger-to-nose and heel-to-shin.  No clumsiness. Rapid alternating movements intact and symmetric.   Reflexes: absent Babinski bilaterally  Gait: Narrow and steady. No ataxia.      MEDICATIONS  (STANDING):  aspirin enteric coated 81 milliGRAM(s) Oral daily  heparin  Injectable 5000 Unit(s) SubCutaneous every 8 hours  atorvastatin 80 milliGRAM(s) Oral at bedtime  pantoprazole    Tablet 40 milliGRAM(s) Oral before breakfast    MEDICATIONS  (PRN):  ALBUTerol/ipratropium for Nebulization 3 milliLiter(s) Nebulizer every 6 hours PRN Shortness of Breath and/or Wheezing  acetaminophen   Tablet. 650 milliGRAM(s) Oral every 6 hours PRN Moderate Pain (4 - 6)  ondansetron Injectable 4 milliGRAM(s) IV Push every 6 hours PRN Nausea and/or Vomiting  guaiFENesin/dextromethorphan  Syrup 5 milliLiter(s) Oral every 4 hours PRN Cough  ibuprofen  Tablet 600 milliGRAM(s) Oral every 4 hours PRN headeache      Labs:                        11.7   6.8   )-----------( 293      ( 20 Jul 2017 06:19 )             36.6     07-20    139  |  103  |  16  ----------------------------<  96  3.7   |  24  |  0.80    Ca    9.1      20 Jul 2017 06:20    TPro  7.3  /  Alb  3.9  /  TBili  1.6<H>  /  DBili  x   /  AST  19  /  ALT  12  /  AlkPhos  73  07-20    LIVER FUNCTIONS - ( 20 Jul 2017 06:20 )  Alb: 3.9 g/dL / Pro: 7.3 g/dL / ALK PHOS: 73 U/L / ALT: 12 U/L / AST: 19 U/L / GGT: x           PT/INR - ( 20 Jul 2017 06:20 )   PT: 12.9 sec;   INR: 1.16     PTT - ( 20 Jul 2017 06:20 )  PTT:34.2 sec    Thyroid Stimulating Hormone, Serum: 0.450 uIU/mL  Cholesterol, Serum: 203 mg/dL  HDL Cholesterol, Serum: 82 mg/dL  Triglycerides, Serum: 78 mg/dL    Hemoglobin A1C, Whole Blood: 5.7 %      Radiology:  7/19 CT Brain Stroke Protocol   IMPRESSION:     No acute intracranial hemorrhage, mass effect or CT evidence of recent   transcortical infarction.     Mild microangiopathic disease.    7/19 CTA head/neck:   IMPRESSION: No large vessel occlusion.  IMPRESSION: Normal CTA of the neck.    7/20 ECHO:  Normal left ventricular size and wall thickness.The left ventricular wall motion is   normal.The left ventricular ejection fraction is estimated to be   60-65%The left atrial size is normal.No evidence for interatrial shunt by color   Doppler assessment.Right atrial size is normal.Injection of agitated saline   contrast documented no interatrial shunt.Structurally normal aortic valve.No   aortic regurgitation noted.Structurally normal mitral valve.There is trace   mitral regurgitation.Structurally normal tricuspid valve.There is trace   tricuspid regurgitation.There is no echocardiographic evidence for pulmonary   hypertension.Structurally normal pulmonic valve.No aortic root   dilatation.There is no pericardial effusion.    Assessment & Plan:    -CT head without acute pathology  -CTA head/neck without large vessel occlusion  -ECHO unremarkable study; EF 60-65%, no PFO  -pending MRI head w/o jed to r/o stroke  -PT consult - no needs, safe for discharge home when medically ready   -if MRI head negative for stroke or other acute pathology then discharge home Stroke Neurology Follow-Up Visit    Admitted to [address] [place] [address] yesterday night.   Pt seen and examined.   Reports doing better compared to yesterday.  Does still have headache but improved, currently 3/10 frontal headache with eye pressure bilaterally, similar characteristic as on admission. Denies feeling congested.   Worked with physical therapy today. No dizziness today upon rest. Had a second of lightheadedness when going from sitting to standing position. No dizziness with ambulation.   No nausea, vomiting, weakness, numbness, or unsteadiness of gait.     Exam:  T(F): 97.2 (07-20-17 @ 10:24), Max: 98.2 (07-19-17 @ 13:48)  HR: 84 (07-20-17 @ 11:10) (70 - 84)  BP: 117/80 (07-20-17 @ 11:10) (101/69 - 144/91)  RR: 16 (07-20-17 @ 10:24) (16 - 18)  SpO2: 98% (07-20-17 @ 11:10) (98% - 100%)    Gen: Sitting up in chair, calm, cooperative, pleasant, in NAD  Neuro:  Mental status: Awake, alert and oriented x3. Follows all commands. Recent and remote memory intact.  Naming, repetition and comprehension intact.  Attention/concentration intact.  No dysarthria, no aphasia.      Cranial nerves: Pupils equally round and reactive to light, 3 mm, visual fields full, no nystagmus, no ptosis, extraocular muscles intact, V1 through V3 intact bilaterally and symmetric, no facial droop, hearing intact to finger rub, palate elevation symmetric, tongue was midline, sternocleidomastoid/shoulder shrug strength bilaterally 5/5.    Motor:  No pronator drift. Normal bulk and tone, strength 5/5 in bilateral upper and lower extremities.   strength strong bilaterally.    Sensation: Intact and symmetric to light touch.  No neglect.   Coordination: No dysmetria on finger-to-nose and heel-to-shin.  No clumsiness. Rapid alternating movements intact and symmetric.   Reflexes: absent Babinski bilaterally  Gait: Narrow and steady. No ataxia.      MEDICATIONS  (STANDING):  aspirin enteric coated 81 milliGRAM(s) Oral daily  heparin  Injectable 5000 Unit(s) SubCutaneous every 8 hours  atorvastatin 80 milliGRAM(s) Oral at bedtime  pantoprazole    Tablet 40 milliGRAM(s) Oral before breakfast    MEDICATIONS  (PRN):  ALBUTerol/ipratropium for Nebulization 3 milliLiter(s) Nebulizer every 6 hours PRN Shortness of Breath and/or Wheezing  acetaminophen   Tablet. 650 milliGRAM(s) Oral every 6 hours PRN Moderate Pain (4 - 6)  ondansetron Injectable 4 milliGRAM(s) IV Push every 6 hours PRN Nausea and/or Vomiting  guaiFENesin/dextromethorphan  Syrup 5 milliLiter(s) Oral every 4 hours PRN Cough  ibuprofen  Tablet 600 milliGRAM(s) Oral every 4 hours PRN headeache      Labs:                        11.7   6.8   )-----------( 293      ( 20 Jul 2017 06:19 )             36.6     07-20    139  |  103  |  16  ----------------------------<  96  3.7   |  24  |  0.80    Ca    9.1      20 Jul 2017 06:20    TPro  7.3  /  Alb  3.9  /  TBili  1.6<H>  /  DBili  x   /  AST  19  /  ALT  12  /  AlkPhos  73  07-20    LIVER FUNCTIONS - ( 20 Jul 2017 06:20 )  Alb: 3.9 g/dL / Pro: 7.3 g/dL / ALK PHOS: 73 U/L / ALT: 12 U/L / AST: 19 U/L / GGT: x           PT/INR - ( 20 Jul 2017 06:20 )   PT: 12.9 sec;   INR: 1.16     PTT - ( 20 Jul 2017 06:20 )  PTT:34.2 sec    Thyroid Stimulating Hormone, Serum: 0.450 uIU/mL  Cholesterol, Serum: 203 mg/dL  HDL Cholesterol, Serum: 82 mg/dL  Triglycerides, Serum: 78 mg/dL    Hemoglobin A1C, Whole Blood: 5.7 %      Radiology:  7/19 CT Brain Stroke Protocol   IMPRESSION:     No acute intracranial hemorrhage, mass effect or CT evidence of recent   transcortical infarction.     Mild microangiopathic disease.    7/19 CTA head/neck:   IMPRESSION: No large vessel occlusion.  IMPRESSION: Normal CTA of the neck.    7/20 ECHO:  Normal left ventricular size and wall thickness.The left ventricular wall motion is   normal.The left ventricular ejection fraction is estimated to be   60-65%The left atrial size is normal.No evidence for interatrial shunt by color   Doppler assessment.Right atrial size is normal.Injection of agitated saline   contrast documented no interatrial shunt.Structurally normal aortic valve.No   aortic regurgitation noted.Structurally normal mitral valve.There is trace   mitral regurgitation.Structurally normal tricuspid valve.There is trace   tricuspid regurgitation.There is no echocardiographic evidence for pulmonary   hypertension.Structurally normal pulmonic valve.No aortic root   dilatation.There is no pericardial effusion.    Assessment & Plan:  62 year old female with PMH of asthma, hyperthyroidism, cardiac murmur, anemia, presents to [facility] with transient L eye blurry vision, tongue heaviness, headache, and dizziness. Of note, pt reports L eye blurry vision has occurred 3 times in the past, was told she requires cataract surgery. Admitted to rule out stroke.     -symptoms much improved today; no further dizziness, headache improved, no further left eye blurry vision   -likely migraine variant yesterday contributing to transient symptoms; but will rule out stroke   -CT head without acute pathology  -CTA head/neck without large vessel occlusion  -ECHO unremarkable study; EF 60-65%, no PFO  -pending MRI head w/o jed to r/o stroke  -PT consult - no needs, safe for discharge home when medically ready   -if MRI head negative for stroke or other acute pathology then discharge home   -continue aspirin 81 mg for stroke prevention   -can readjust atorvastatin dose after MRI performed

## 2017-07-20 NOTE — DISCHARGE NOTE ADULT - CARE PROVIDER_API CALL
Roberto Rincon (MD), Internal Medicine  [address] [place] [address]  Phone: [phone]  Fax: [phone] Roberto Rincon), Internal Medicine  [address] [place] [address]  Phone: [phone]  Fax: [phone]    Robin Rutherford), Neurology; Vascular Neurology  [address] [address]  Phone: [phone]  Fax: [phone]

## 2017-07-20 NOTE — DISCHARGE NOTE ADULT - SECONDARY DIAGNOSIS.
Uncomplicated asthma, unspecified asthma severity Anemia, unspecified type Hyperthyroidism Gastroesophageal reflux disease, esophagitis presence not specified

## 2017-07-20 NOTE — CONSULT NOTE ADULT - SUBJECTIVE AND OBJECTIVE BOX
NEUROLOGY INITIAL CONSULT NOTE    CHIEF COMPLAINT:      HPI:  63 yo F presented to the ED with L eye blurred vision, perioral numbness, and L upper ext paresthesias starting at noon today. Pt reports that she has had a bifrontal headache x 1 wk that worsened today. She reports that today she was not able to read number plates w/ her L eye 2/2 blurry vision which resolved and was followed by numbness and tingling in left arm and lips. She was then told to go to the ED by her coworkers. Her symptoms resolved by the time she arrived to the ED.  She reports two similar episodes of transient L eye blindness in the last 6 months which resolved spontaneously and for which she did not seek medical attention.  Her NIHSS score was 2 and tPA was not administered 2/2 rapidly resolving symptoms. CTA head revealed no acute intracranial hemorrhage, mass effect or CT evidence of recent transcortical infarction. However, it was evident for mild microangiopathic disease. The pt specifically denies nausea, vomiting, HA, vision changes, motor or sensory deficits at present.        PAST MEDICAL & SURGICAL HISTORY:  Anemia: mild  Tremor  Sarcoidosis  Hyperthyroidism  Acquired leg length discrepancy  Cardiac murmur  Anxiety  Asthma  History of surgery: breast puncture aspiration of cyst  History of hip surgery: right      REVIEW OF SYSTEMS:  As per HPI, otherwise negative for Constitutional, Eyes, Ears/Nose/Mouth/Throat, Neck, Cardiovascular, Respiratory, Gastrointestinal, Genitourinary, Skin, Endocrine, Musculoskeletal, Psychiatric, and Hematologic/Lymphatic.    MEDICATIONS  (STANDING):  ondansetron Injectable 4 milliGRAM(s) IV Push once  heparin  Injectable 5000 Unit(s) SubCutaneous every 8 hours    MEDICATIONS  (PRN):  ALBUTerol/ipratropium for Nebulization 3 milliLiter(s) Nebulizer every 6 hours PRN Shortness of Breath and/or Wheezing  acetaminophen   Tablet. 650 milliGRAM(s) Oral every 6 hours PRN Moderate Pain (4 - 6)  ondansetron Injectable 4 milliGRAM(s) IV Push every 6 hours PRN Nausea and/or Vomiting      Allergies    dust (Sneezing; Rhinorrhea; Eye Irritation)  No Known Drug Allergies    Intolerances        FAMILY HISTORY:  Family history of malignant neoplasm of prostate in father (Father)  Family history of hypertension (Mother)  Family history of asthma (Father)      SOCIAL HISTORY:  Living Situation:  Occupation:  Tobacco:  Alcohol:  Drug use:      VITAL SIGNS:  Vital Signs Last 24 Hrs  T(C): 36.7 (19 Jul 2017 18:43), Max: 36.8 (19 Jul 2017 13:48)  T(F): 98.1 (19 Jul 2017 18:43), Max: 98.2 (19 Jul 2017 13:48)  HR: 83 (19 Jul 2017 18:43) (71 - 83)  BP: 144/91 (19 Jul 2017 18:43) (118/73 - 144/91)  BP(mean): --  RR: 18 (19 Jul 2017 18:43) (18 - 18)  SpO2: 100% (19 Jul 2017 18:43) (98% - 100%)    PHYSICAL EXAMINATION:  General: Well-developed, well nourished, in no acute distress.  Eyes: Conjunctiva and sclera clear.  Cardiovascular: Regular rate and rhythm; S1 and S2 Normal; No murmurs, gallops or rubs.  Neurologic:  - Mental Status:  Alert, awake, oriented to person, place, and time; Speech is fluent. Good overall fund of knowledge.  - Cranial Nerves II-XII:    II:  Visual acuity is 20/20 bilaterally; Visual fields are full to confrontation; Fundoscopic exam is normal with sharp discs; Pupils are equal, round, and reactive to light.  III, IV, VI:  Extraocular movements are intact without nystagmus.  V:  Facial sensation is intact in the V1-V3 distribution bilaterally.  VII:  Mild L sided facial dropping w/ good activation  VIII:  Hearing is intact to finger rub.  IX, X:  Uvula is midline and soft palate rises symmetrically  XI:  Head turning and shoulder shrug are intact.  XII:  Tongue protrudes in the midline.  - Motor:  Strength is 5/5 in b/l UE and 4/5 in b/l LE.  There is no pronator drift.  Normal muscle bulk and tone throughout.  - Reflexes:  2+ and symmetric at the biceps, triceps, brachioradialis, knees, and ankles.  Plantar responses flexor.  - Sensory:  Intact to light touch, pin prick, vibration, and joint-position sense throughout.  - Coordination:  Finger-nose-finger and heel-knee-shin intact without dysmetria.  Rapid alternating hand movements intact.  - Gait:   Normal steps, base, arm swing, and turning. No pronator drift.     LABS:                        11.6   4.3   )-----------( 278      ( 19 Jul 2017 14:11 )             34.5     07-19    142  |  106  |  19  ----------------------------<  86  3.8   |  23  |  0.70    Ca    9.4      19 Jul 2017 14:11    TPro  7.6  /  Alb  3.7  /  TBili  1.1  /  DBili  <0.2  /  AST  21  /  ALT  12  /  AlkPhos  72  07-19    PT/INR - ( 19 Jul 2017 14:35 )   PT: 12.6 sec;   INR: 1.13          PTT - ( 19 Jul 2017 14:35 )  PTT:32.4 sec  Urinalysis Basic - ( 19 Jul 2017 16:54 )    Color: Yellow / Appearance: Clear / SG: <=1.005 / pH: x  Gluc: x / Ketone: NEGATIVE  / Bili: NEGATIVE / Urobili: 0.2 E.U./dL   Blood: x / Protein: NEGATIVE mg/dL / Nitrite: NEGATIVE   Leuk Esterase: NEGATIVE / RBC: < 5 /HPF / WBC < 5 /HPF   Sq Epi: x / Non Sq Epi: Rare /HPF / Bacteria: Present /HPF        RADIOLOGY & ADDITIONAL STUDIES:      IMPRESSION & PLAN:
Patient is a 62y old  Female who presents with a chief complaint of Persistent pain in head, frontal. Flashes of light in left eye (19 Jul 2017 18:51)      HPI:  63 yo F with PMH asthma, hyperthyroidism,cardiac murmur, anemia, presented to the ED with L eye blurred vision, tongue heaviness, L upper ext numbness. Pt states that she was at work, stepped outside to buy something and felt this intense headache. She has had a headache for the past week, that worsened today. The headache is 7/10, constant, bifrontal, heavy weight feeling. She had some flashes on light in her L eye, which went blind, followed by numbness and tingling in left arm and her lips. She reports photophobia but no sonophobia. She took her prescribed pain meds which improved her hip pain but did not help with her headache. She has no family Hx of migraines and has had no prior episodes in the past. She was told to go to the ED by  her coworkers. Currently pt denies any extremity weakness, chest pain, sob, headache, changes in bowel movement, urinary symptoms fever/chills, nuchal rigidity, or recent illnesses. She has had some jaw claudication described as "her mouth getting tired when she eats" for the past 6 months, but no muscle weakness reported    Of note pt underwent an upper endoscopy 1 week PTA because of coughing, she was found to have "2  hernia in the stomach and some inflammation    ED: T 98.2, HR 71, /77 RR 18 SpO2 100% on RA. CBC, CMP, coags, cardiac markers, UA unremarkable. NIH stroke scale of 2. CTA neck normal, CTA head with no acute intracranial hemorrhage, mass effect or CT evidence of recent transcortical infarction. Mild microangiopathic disease. (19 Jul 2017 18:46)      PAST MEDICAL & SURGICAL HISTORY:  Anemia: mild  Tremor  Sarcoidosis  Hyperthyroidism  Acquired leg length discrepancy  Cardiac murmur  Anxiety  Asthma  History of surgery: breast puncture aspiration of cyst  History of hip surgery: right      MEDICATIONS  (STANDING):  aspirin enteric coated 81 milliGRAM(s) Oral daily  heparin  Injectable 5000 Unit(s) SubCutaneous every 8 hours  atorvastatin 80 milliGRAM(s) Oral at bedtime  pantoprazole    Tablet 40 milliGRAM(s) Oral before breakfast    MEDICATIONS  (PRN):  ALBUTerol/ipratropium for Nebulization 3 milliLiter(s) Nebulizer every 6 hours PRN Shortness of Breath and/or Wheezing  acetaminophen   Tablet. 650 milliGRAM(s) Oral every 6 hours PRN Moderate Pain (4 - 6)  ondansetron Injectable 4 milliGRAM(s) IV Push every 6 hours PRN Nausea and/or Vomiting  guaiFENesin/dextromethorphan  Syrup 5 milliLiter(s) Oral every 4 hours PRN Cough  ibuprofen  Tablet 600 milliGRAM(s) Oral every 4 hours PRN headeache      Social History: , has 2 adult children, works as a , lives alone in a private house [facility]    Functional Level Prior to Admission: ADL independent, walks without assistive devices    FAMILY HISTORY:  Family history of malignant neoplasm of prostate in father (Father)  Family history of hypertension (Mother)  Family history of asthma (Father)      CBC Full  -  ( 20 Jul 2017 06:19 )  WBC Count : 6.8 K/uL  Hemoglobin : 11.7 g/dL  Hematocrit : 36.6 %  Platelet Count - Automated : 293 K/uL  Mean Cell Volume : 89.1 fL  Mean Cell Hemoglobin : 28.5 pg  Mean Cell Hemoglobin Concentration : 32.0 g/dL  Auto Neutrophil # : x  Auto Lymphocyte # : x  Auto Monocyte # : x  Auto Eosinophil # : x  Auto Basophil # : x  Auto Neutrophil % : x  Auto Lymphocyte % : x  Auto Monocyte % : x  Auto Eosinophil % : x  Auto Basophil % : x      07-20    139  |  103  |  16  ----------------------------<  96  3.7   |  24  |  0.80    Ca    9.1      20 Jul 2017 06:20    TPro  7.3  /  Alb  3.9  /  TBili  1.6<H>  /  DBili  x   /  AST  19  /  ALT  12  /  AlkPhos  73  07-20      Urinalysis Basic - ( 19 Jul 2017 16:54 )    Color: Yellow / Appearance: Clear / SG: <=1.005 / pH: x  Gluc: x / Ketone: NEGATIVE  / Bili: NEGATIVE / Urobili: 0.2 E.U./dL   Blood: x / Protein: NEGATIVE mg/dL / Nitrite: NEGATIVE   Leuk Esterase: NEGATIVE / RBC: < 5 /HPF / WBC < 5 /HPF   Sq Epi: x / Non Sq Epi: Rare /HPF / Bacteria: Present /HPF          Radiology:      < from: CT Brain Stroke Protocol (07.19.17 @ 14:24) >  EXAM:  CT BRAIN STROKE PROTOCOL                          PROCEDURE DATE:  07/19/2017                INTERPRETATION:  INDICATIONS: Left-sided blurry vision and tongue   heaviness. Rule out stroke.    TECHNIQUE:  Serial axial images were obtained from the skull base to the   vertex without the use of intravenous contrast. Imaging is performed   using helical low-dose technique, and sagittal and coronal reformations   are provided.    COMPARISON EXAMINATION: None.    FINDINGS:    VENTRICLES AND SULCI: Age-appropriate minimal generalized parenchymal   volume loss.   INTRA-AXIAL: No acute hemorrhage or transcortical infarct. No midline   shift present.  Patchy periventricular lucency is noted, suggestive of   mild microangiopathic ischemic disease.  EXTRA-AXIAL: No extra-axial fluid collection is present.   VISUALIZED SINUSES: No air-fluid levels are identified.   VISUALIZED MASTOIDS:  Clear.  CALVARIUM:  Normal.  MISCELLANEOUS:  None.    The study was performed at 2:10 PM on and the above findings were   discussed with Dr. Kelly of the ED at 2:19 PM    IMPRESSION:     No acute intracranial hemorrhage, mass effect or CT evidence of recent   transcortical infarction.     Mild microangiopathic disease.          < from: CT Angio Head w/ IV Cont (07.19.17 @ 14:33) >  EXAM:  CT ANGIO NECK (W)AW IC                          EXAM:  CT ANGIO BRAIN (W)AW IC                          PROCEDURE DATE:  07/19/2017     100  [employer]ir[employer] 350   20           INTERPRETATION:  PROCEDURE: CTA brain with and without intravenous   contrast.    INDICATION: Tongue heaviness and left-sided blurry vision. Rule out   stroke.    TECHNIQUE: Multiple thin section axial images were obtained through the   [place] following the intravenous injection of contrast. MPR   sagittal and coronal MIP images were generated from the axial images. 3-D   reconstructions were also obtained and postprocessed on a independent   workstation.    COMPARISON: None    FINDINGS: The CTA examination of the [place] demonstrates the   internal carotid arteries to be normal in caliber. There is a normal   bifurcation into the A1 and M1 segments. The left A1 segment is mildly   underdeveloped. The A2 segments appear intact bilaterally. There is a   normal MCA bifurcation. The vertebral arteries are normal in caliber. The   basilar artery is normal in caliber. There is a normal bifurcation into   the posterior cerebral arteries. The right P1 segment is hypoplastic with   a prominent right posterior communicating artery supplying the right PCA   territory compatible with a fetal origin. There are no areas of stenosis,   dilatation or aneurysm.    IMPRESSION: No large vessel occlusion.      PROCEDURE: CTA neck with and without intravenous contrast.    INDICATION: Tongue heaviness and left-sided blurry vision. Rule out   stroke.    TECHNIQUE: Multiple axial thin section were obtained through the neck   following the intravenous injection of contrast. MPR sagittal and coronal   MIP images were generated from the axial images. 3-D reconstructions were   also obtained and postprocessed on a independent workstation.    COMPARISON: None    FINDINGS: The CTA examination demonstrates the right common carotid   artery to be normal in caliber. There is a normal bifurcation into the   rightinternal and external carotid arteries. There is no hemodynamically   significant stenosis.     The left common carotid artery is normal in caliber. There is a normal   bifurcation into the left internal and external carotid arteries. There   is no hemodynamically significant stenosis.     The visualized vertebral arteries are normal in caliber.    The aortic arch appears intact without narrowing of the origin of the   great vessels.    Limited evaluation of the neck demonstrates large bony protuberances   arising from the hard palate compatible with franco. The Thyroid gland to   be heterogeneous with focal areas of hypodensity which is nonspecific and   may represent colloid cyst/adenomas. This may be better evaluated with   ultrasound exam as clinically indicated.    IMPRESSION: Normal CTA of the neck.                    Vital Signs Last 24 Hrs  T(C): 36.7 (20 Jul 2017 05:59), Max: 36.8 (19 Jul 2017 13:48)  T(F): 98.1 (20 Jul 2017 05:59), Max: 98.2 (19 Jul 2017 13:48)  HR: 70 (20 Jul 2017 05:59) (70 - 83)  BP: 101/69 (20 Jul 2017 05:59) (101/69 - 144/91)  BP(mean): --  RR: 18 (20 Jul 2017 05:59) (18 - 18)  SpO2: 100% (20 Jul 2017 05:59) (98% - 100%)    REVIEW OF SYSTEMS:    CONSTITUTIONAL:  fatigue  EYES: No eye pain, visual disturbances, or discharge  ENMT:  No difficulty hearing, tinnitus, vertigo; No sinus or throat pain  NECK: No pain or stiffness  BREASTS: No pain, masses, or nipple discharge  RESPIRATORY: No cough, wheezing, chills or hemoptysis; No shortness of breath  CARDIOVASCULAR: No chest pain, palpitations, dizziness, or leg swelling  GASTROINTESTINAL: No abdominal or epigastric pain. No nausea, vomiting, or hematemesis; No diarrhea or constipation. No melena or hematochezia.  GENITOURINARY: No dysuria, frequency, hematuria, or incontinence  NEUROLOGICAL: headache, improved since admission  SKIN: No itching, burning, rashes, or lesions   LYMPH NODES: No enlarged glands  ENDOCRINE: No heat or cold intolerance; No hair loss  MUSCULOSKELETAL: No joint pain or swelling; No muscle, back, or extremity pain  PSYCHIATRIC: No depression, anxiety, mood swings, or difficulty sleeping  HEME/LYMPH: No easy bruising, or bleeding gums  ALLERGY AND IMMUNOLOGIC: No hives or eczema       Physical Exam: thin appearing AA woman, lying in bed, "feels better", c/o right hip/groin pain    HEENT: normocephalic/ atraumatic, anicteric    Neck: supple, negative JVD, negative carotid bruits,    Chest: CTA bilaterally, neg wheeze, rhonchi, rales, crackles, egophany    Cardiovascular: regular rate and rhythm, neg murmurs/rubs/gallops    Abdomen: soft, non distended, non tender, negative rebound/guarding, normal bowel sounds, neg hepatosplenomegaly    Extremities: WWP, neg cyanosis/clubbing/edema, negative calf tenderness to palpation, negative Sue's sign    Right hip: decreased external rotation 0-75 degrees, neg log roll, non tender    Neurologic Exam:    Alert and oriented to person, place, date/year, speech fluent w/o dysarthria, repetition intact, comprehension intact,     Cranial Nerves:     II:                       pupils equal, round and reactive to light, visual fields intact   III/ IV/VI:             extraocular movements intact, neg nystagmus, ptosis  V:                      facial sensation intact, V1-3 normal  VII:                     face symmetric, no droop, normal eye closure and smile  VIII:                    hearing intact to finger rub bilaterally  IX/ X:                  soft palate rise symmetrical  XI:                      head turning, shoulder shrug normal  XII:                     tongue midline    Motor Exam:    Bilateral UE:        5/5 /intrinsics                            5/5 biceps/triceps/wrist extensors-flexors/deltoid                            negative pronator drift      Bilateral LE:        4+/5 hip flexors/adductors/abductors                            5/5 quadriceps/hamstrings                            5/5 dorsiflexors/plantar flexors/invertors-evertors    Sensory: intact to LT/PP in all UE/LE dermatomes    DTR:         2+ = biceps/     triceps/     brachioradialis                  2+ = patella/   medial hamstring/    ankle                 neg clonus                 neg Babinski                 neg Hoffmans    Finger to Nose: wnl    Heel to Shin:      wnl    Rapid Alternating movements:  wnl    Joint Position Sense:  intact    Romberg: NT    Tandem Walking: NT    Gait:  NT        PM&R Impression:    1) deconditioned  2) OA right hip   3) headache  4) no focal weakness      Recommendations:    1) Physical therapy focusing on therapeutic exercises, bed mobility/transfer out of bed evaluation, progressive ambulation with assistive devices.    2) Disposition Plan: d/c home

## 2017-07-20 NOTE — OCCUPATIONAL THERAPY INITIAL EVALUATION ADULT - STANDING BALANCE: DYNAMIC, REHAB EVAL
patient ambulated approximately 25 feet independently, reports transient dizziness upon standing/good balance

## 2017-07-20 NOTE — PROGRESS NOTE ADULT - PROBLEM SELECTOR PLAN 1
Pt with initial symptoms concerning for acute stroke. CTA head/neck negative. Pt with headache but no focal findings on exam. Clinical picture likely due to migraine headaches with aura however atypical due to later age of onset. Temporal arteritis unlikely with normal ESR. Greater occipital nerve necropathy lower in the differential given lack of posterior neck pain to palpation    -ibuprogen for headache   - mg, lipitor 80 mg STAT.   -MRI Brain w/o con

## 2017-07-20 NOTE — PHYSICAL THERAPY INITIAL EVALUATION ADULT - MODIFIED CLINICAL TEST: SENSORY INTEGRATION IN BALANCE TEST, REHAB EVAL
Pt performed static standing with feet together 30s, tandem 30s (slight increase in sway), and feet together eyes closed 30s (slight increase in sway).

## 2017-07-20 NOTE — DISCHARGE NOTE ADULT - CARE PLAN
Principal Discharge DX:	Stroke-like symptoms  Goal:	There is no evidence that you had a stroke and your symptoms have resolved. Follow up with your PCP.  Instructions for follow-up, activity and diet:	You were admitted with symptoms that were concerning for a stroke. Fortunately, after extensive evaluation including CT scan of the head it was confirmed that you did not have a stroke. Since the tingling in your arm and blurry vision have resolved, it is safe to discharge you home. You were treated with NSAIDS such as ibuprofen in the hospital that seemed to improve your headache. You may continue taking over the counter ibuprofen for your headache if it persists. However, please follow up with your primary care provider. You will be discharged on aspirin 81 mg daily which is for stroke prevention.  Secondary Diagnosis:	Uncomplicated asthma, unspecified asthma severity  Instructions for follow-up, activity and diet:	Please continue your home regimen.  Secondary Diagnosis:	Anemia, unspecified type  Instructions for follow-up, activity and diet:	Your hemoglobin level was within normal limits (11.7) during your hospital stay. Continue eating a balanced diet.  Secondary Diagnosis:	Hyperthyroidism  Instructions for follow-up, activity and diet:	The lab value we use to monitor your thyroid (TSH) was normal during this hospital stay. Please continue your regular outpatient care. Principal Discharge DX:	Stroke-like symptoms  Goal:	There is no evidence that you had a stroke and your symptoms have resolved. Follow up with your PCP.  Instructions for follow-up, activity and diet:	You were admitted with symptoms that were concerning for a stroke. Fortunately, after extensive evaluation including CT scan of the head it was confirmed that you did not have a stroke. Since the tingling in your arm and blurry vision have resolved, it is safe to discharge you home. You were treated with NSAIDS such as ibuprofen in the hospital that seemed to improve your headache. You may continue taking over the counter ibuprofen for your headache if it persists. However, please follow up with your primary care provider. You will be discharged on aspirin 81 mg daily which is for stroke prevention.  Secondary Diagnosis:	Uncomplicated asthma, unspecified asthma severity  Instructions for follow-up, activity and diet:	Please continue your home regimen.  Secondary Diagnosis:	Anemia, unspecified type  Instructions for follow-up, activity and diet:	Your hemoglobin level was within normal limits (11.7) during your hospital stay. Continue eating a balanced diet.  Secondary Diagnosis:	Hyperthyroidism  Instructions for follow-up, activity and diet:	The lab value we use to monitor your thyroid (TSH) was normal during this hospital stay. Please continue your regular outpatient care.  Secondary Diagnosis:	Gastroesophageal reflux disease, esophagitis presence not specified  Instructions for follow-up, activity and diet:	Your recent endoscopy showed evidence of gastroesophageal reflux, also known as GERD. Your untreated GERD could be contributing to your chronic cough. We prescribed you a month long supply of Protonix which is a medication to help treat your GERD. Please follow up with your primary care provider to discuss continuation of this medication. Principal Discharge DX:	Stroke-like symptoms  Goal:	There is no evidence that you had a stroke and your symptoms have resolved. Follow up with your PCP.  Instructions for follow-up, activity and diet:	You were admitted with symptoms that were concerning for a stroke. Fortunately, after extensive evaluation including CT scan of the head and MRI of the head it was confirmed that you did not have a stroke. Since the tingling in your arm and blurry vision have resolved, it is safe to discharge you home. You were treated with NSAIDS such as ibuprofen in the hospital that seemed to improve your headache. You may continue taking over the counter ibuprofen for your headache if it persists. However, please follow up with your primary care provider to discuss alternative options because long term use of Ibuprofen is not good for your gastrointestinal system. You will be discharged on aspirin 81 mg daily which is for stroke prevention. Please take this as prescribed. You may follow up with our stroke attending, Dr. Rutherford, if you wish. His office information is listed below.  Secondary Diagnosis:	Uncomplicated asthma, unspecified asthma severity  Instructions for follow-up, activity and diet:	Please continue your home regimen.  Secondary Diagnosis:	Anemia, unspecified type  Instructions for follow-up, activity and diet:	Your hemoglobin level was within normal limits (11.7) during your hospital stay. Continue eating a balanced diet.  Secondary Diagnosis:	Hyperthyroidism  Instructions for follow-up, activity and diet:	The lab value we use to monitor your thyroid (TSH) was normal during this hospital stay. Please continue your regular outpatient care.  Secondary Diagnosis:	Gastroesophageal reflux disease, esophagitis presence not specified  Instructions for follow-up, activity and diet:	Your recent endoscopy showed evidence of gastroesophageal reflux, also known as GERD. Your untreated GERD could be contributing to your chronic cough. We prescribed you a month long supply of Protonix which is a medication to help treat your GERD. Please follow up with your primary care provider to discuss continuation of this medication.

## 2017-07-20 NOTE — DISCHARGE NOTE ADULT - PATIENT PORTAL LINK FT
“You can access the [facility] Patient Portal, offered by [facility], by registering with the following website: [email or web address]”

## 2017-07-20 NOTE — PHYSICAL THERAPY INITIAL EVALUATION ADULT - PERTINENT HX OF CURRENT PROBLEM, REHAB EVAL
As per chart: 61 yo F presented to the ED with L eye blurred vision, perioral numbness, and L upper ext paresthesias starting at noon today. Pt reports that she has had a bifrontal headache x 1 wk that worsened today. She reports that today she was not able to read number plates w/ her L eye 2/2 blurry vision which resolved and was followed by numbness and tingling in left arm and lips

## 2017-07-20 NOTE — OCCUPATIONAL THERAPY INITIAL EVALUATION ADULT - GENERAL OBSERVATIONS, REHAB EVAL
Right hand dominant. Chart reviewed, patient cleared for OT eval by ABDI Aaron. Received seated in bedside chair, NAD, +heplock.

## 2017-07-20 NOTE — PROGRESS NOTE ADULT - PROBLEM SELECTOR PLAN 3
Pt denies taking any medications. She has not experiences any heart palpitations or weight loss. TSH normal. Duoneb PRN for SOB/wheezing

## 2017-07-20 NOTE — OCCUPATIONAL THERAPY INITIAL EVALUATION ADULT - MD ORDER
61 yo F with PMH asthma, hyperthyroidism,cardiac murmur, anemia, presented to the ED with L eye blurred vision, tongue heaviness, L upper ext numbness. She has had a headache for the past week, that worsened today. The headache is 7/10, constant, bifrontal, heavy weight feeling. She had some flashes on light in her L eye, which went blind, followed by numbness and tingling in left arm and her lips. She reports photophobia but no sonophobia.

## 2017-07-20 NOTE — DISCHARGE NOTE ADULT - PLAN OF CARE
There is no evidence that you had a stroke and your symptoms have resolved. Follow up with your PCP. You were admitted with symptoms that were concerning for a stroke. Fortunately, after extensive evaluation including CT scan of the head it was confirmed that you did not have a stroke. Since the tingling in your arm and blurry vision have resolved, it is safe to discharge you home. You were treated with NSAIDS such as ibuprofen in the hospital that seemed to improve your headache. You may continue taking over the counter ibuprofen for your headache if it persists. However, please follow up with your primary care provider. You will be discharged on aspirin 81 mg daily which is for stroke prevention. Please continue your home regimen. Your hemoglobin level was within normal limits (11.7) during your hospital stay. Continue eating a balanced diet. The lab value we use to monitor your thyroid (TSH) was normal during this hospital stay. Please continue your regular outpatient care. Your recent endoscopy showed evidence of gastroesophageal reflux, also known as GERD. Your untreated GERD could be contributing to your chronic cough. We prescribed you a month long supply of Protonix which is a medication to help treat your GERD. Please follow up with your primary care provider to discuss continuation of this medication. You were admitted with symptoms that were concerning for a stroke. Fortunately, after extensive evaluation including CT scan of the head and MRI of the head it was confirmed that you did not have a stroke. Since the tingling in your arm and blurry vision have resolved, it is safe to discharge you home. You were treated with NSAIDS such as ibuprofen in the hospital that seemed to improve your headache. You may continue taking over the counter ibuprofen for your headache if it persists. However, please follow up with your primary care provider to discuss alternative options because long term use of Ibuprofen is not good for your gastrointestinal system. You will be discharged on aspirin 81 mg daily which is for stroke prevention. Please take this as prescribed. You may follow up with our stroke attending, Dr. Rutherford, if you wish. His office information is listed below.

## 2017-07-20 NOTE — PROGRESS NOTE ADULT - ASSESSMENT
61 yo F with PMH of asthma, hyperthyroidism, anxiety, p/w intense headache, photophobia and paresthesia of the hands and lips; s/p negative CTA head and neck due to concern for stroke.

## 2017-07-20 NOTE — PROGRESS NOTE ADULT - SUBJECTIVE AND OBJECTIVE BOX
O/N Events: No acute events overnight.  Subjective: patient seen and examined at bedside. Complains of 3-4/10 persistent bilateral frontal headache. Denies any further neurological symptoms such as paresthesias, blurry vision, and weakness. Has no new complaints and denies n/v/d, chest pain, SOB, abdominal pain, dysuria, myalgia.     VITALS  Vital Signs Last 24 Hrs  T(C): 36.2 (20 Jul 2017 10:24), Max: 36.8 (19 Jul 2017 17:54)  T(F): 97.2 (20 Jul 2017 10:24), Max: 98.2 (19 Jul 2017 17:54)  HR: 84 (20 Jul 2017 11:10) (70 - 84)  BP: 117/80 (20 Jul 2017 11:10) (101/69 - 144/91)  BP(mean): --  RR: 16 (20 Jul 2017 10:24) (16 - 18)  SpO2: 98% (20 Jul 2017 11:10) (98% - 100%)    I&O's Summary      CAPILLARY BLOOD GLUCOSE  80 (19 Jul 2017 14:36)          PHYSICAL EXAM  General: A&Ox 3; NAD  Head: NC/AT;   Eyes: PERRL; EOMI; anicteric sclera  Neck: Supple; no JVD  Respiratory: CTA B/L; no wheezes/crackles/rales auscultated w/ good air movement  Cardiovascular: Regular rhythm/rate; S1/S2; no gallops or murmurs auscultated  Gastrointestinal: Soft; NTND w/out rebound tenderness or guarding; bowel sounds normal  Extremities: WWP; no edema or cyanosis; radial/pedal pulses palpable  Neurological:  CNII-XII grossly intact; no obvious focal deficits    MEDICATIONS  (STANDING):  aspirin enteric coated 81 milliGRAM(s) Oral daily  heparin  Injectable 5000 Unit(s) SubCutaneous every 8 hours  atorvastatin 80 milliGRAM(s) Oral at bedtime  pantoprazole    Tablet 40 milliGRAM(s) Oral before breakfast    MEDICATIONS  (PRN):  ALBUTerol/ipratropium for Nebulization 3 milliLiter(s) Nebulizer every 6 hours PRN Shortness of Breath and/or Wheezing  acetaminophen   Tablet. 650 milliGRAM(s) Oral every 6 hours PRN Moderate Pain (4 - 6)  ondansetron Injectable 4 milliGRAM(s) IV Push every 6 hours PRN Nausea and/or Vomiting  guaiFENesin/dextromethorphan  Syrup 5 milliLiter(s) Oral every 4 hours PRN Cough  ibuprofen  Tablet 600 milliGRAM(s) Oral every 4 hours PRN headeache      LABS                        11.7   6.8   )-----------( 293      ( 20 Jul 2017 06:19 )             36.6     07-20    139  |  103  |  16  ----------------------------<  96  3.7   |  24  |  0.80    Ca    9.1      20 Jul 2017 06:20    TPro  7.3  /  Alb  3.9  /  TBili  1.6<H>  /  DBili  x   /  AST  19  /  ALT  12  /  AlkPhos  73  07-20    LIVER FUNCTIONS - ( 20 Jul 2017 06:20 )  Alb: 3.9 g/dL / Pro: 7.3 g/dL / ALK PHOS: 73 U/L / ALT: 12 U/L / AST: 19 U/L / GGT: x           PT/INR - ( 20 Jul 2017 06:20 )   PT: 12.9 sec;   INR: 1.16          PTT - ( 20 Jul 2017 06:20 )  PTT:34.2 sec  Urinalysis Basic - ( 19 Jul 2017 16:54 )    Color: Yellow / Appearance: Clear / SG: <=1.005 / pH: x  Gluc: x / Ketone: NEGATIVE  / Bili: NEGATIVE / Urobili: 0.2 E.U./dL   Blood: x / Protein: NEGATIVE mg/dL / Nitrite: NEGATIVE   Leuk Esterase: NEGATIVE / RBC: < 5 /HPF / WBC < 5 /HPF   Sq Epi: x / Non Sq Epi: Rare /HPF / Bacteria: Present /HPF      CARDIAC MARKERS ( 19 Jul 2017 14:11 )  x     / <0.01 ng/mL / 149 U/L / x     / 2.5 ng/mL        IMAGING/EKG/ETC  EKG:   Xray:  CT: O/N Events: No acute events overnight.  Subjective: patient seen and examined at bedside. Complains of 3-4/10 persistent bilateral frontal headache. Denies any further neurological symptoms such as paresthesias, blurry vision, and weakness. Complains of a chronic non-productive cough and denies n/v/d, chest pain, SOB, abdominal pain, dysuria, myalgia.     VITALS  Vital Signs Last 24 Hrs  T(C): 36.2 (20 Jul 2017 10:24), Max: 36.8 (19 Jul 2017 17:54)  T(F): 97.2 (20 Jul 2017 10:24), Max: 98.2 (19 Jul 2017 17:54)  HR: 84 (20 Jul 2017 11:10) (70 - 84)  BP: 117/80 (20 Jul 2017 11:10) (101/69 - 144/91)  BP(mean): --  RR: 16 (20 Jul 2017 10:24) (16 - 18)  SpO2: 98% (20 Jul 2017 11:10) (98% - 100%)    I&O's Summary      CAPILLARY BLOOD GLUCOSE  80 (19 Jul 2017 14:36)          PHYSICAL EXAM  General: A&Ox 3; NAD  Head: NC/AT;   Eyes: PERRL; EOMI; anicteric sclera  Neck: Supple; no JVD  Respiratory: CTA B/L; no wheezes/crackles/rales auscultated w/ good air movement  Cardiovascular: Regular rhythm/rate; S1/S2; no gallops or murmurs auscultated  Gastrointestinal: Soft; NTND w/out rebound tenderness or guarding; bowel sounds normal  Extremities: WWP; no edema or cyanosis; radial/pedal pulses palpable  Neurological:  CNII-XII grossly intact; no obvious focal deficits    MEDICATIONS  (STANDING):  aspirin enteric coated 81 milliGRAM(s) Oral daily  heparin  Injectable 5000 Unit(s) SubCutaneous every 8 hours  atorvastatin 80 milliGRAM(s) Oral at bedtime  pantoprazole    Tablet 40 milliGRAM(s) Oral before breakfast    MEDICATIONS  (PRN):  ALBUTerol/ipratropium for Nebulization 3 milliLiter(s) Nebulizer every 6 hours PRN Shortness of Breath and/or Wheezing  acetaminophen   Tablet. 650 milliGRAM(s) Oral every 6 hours PRN Moderate Pain (4 - 6)  ondansetron Injectable 4 milliGRAM(s) IV Push every 6 hours PRN Nausea and/or Vomiting  guaiFENesin/dextromethorphan  Syrup 5 milliLiter(s) Oral every 4 hours PRN Cough  ibuprofen  Tablet 600 milliGRAM(s) Oral every 4 hours PRN headeache      LABS                        11.7   6.8   )-----------( 293      ( 20 Jul 2017 06:19 )             36.6     07-20    139  |  103  |  16  ----------------------------<  96  3.7   |  24  |  0.80    Ca    9.1      20 Jul 2017 06:20    TPro  7.3  /  Alb  3.9  /  TBili  1.6<H>  /  DBili  x   /  AST  19  /  ALT  12  /  AlkPhos  73  07-20    LIVER FUNCTIONS - ( 20 Jul 2017 06:20 )  Alb: 3.9 g/dL / Pro: 7.3 g/dL / ALK PHOS: 73 U/L / ALT: 12 U/L / AST: 19 U/L / GGT: x           PT/INR - ( 20 Jul 2017 06:20 )   PT: 12.9 sec;   INR: 1.16          PTT - ( 20 Jul 2017 06:20 )  PTT:34.2 sec  Urinalysis Basic - ( 19 Jul 2017 16:54 )    Color: Yellow / Appearance: Clear / SG: <=1.005 / pH: x  Gluc: x / Ketone: NEGATIVE  / Bili: NEGATIVE / Urobili: 0.2 E.U./dL   Blood: x / Protein: NEGATIVE mg/dL / Nitrite: NEGATIVE   Leuk Esterase: NEGATIVE / RBC: < 5 /HPF / WBC < 5 /HPF   Sq Epi: x / Non Sq Epi: Rare /HPF / Bacteria: Present /HPF      CARDIAC MARKERS ( 19 Jul 2017 14:11 )  x     / <0.01 ng/mL / 149 U/L / x     / 2.5 ng/mL        IMAGING/EKG/ETC  EKG:   Xray:  CT:

## 2017-07-20 NOTE — PROGRESS NOTE ADULT - PROBLEM SELECTOR PLAN 4
-DVT prophylaxis Hep SQ  -DASH diet Pt denies taking any medications. She has not experiences any heart palpitations or weight loss. TSH normal.

## 2017-07-20 NOTE — DISCHARGE NOTE ADULT - CARE PROVIDERS DIRECT ADDRESSES
[email or web address]

## 2017-07-20 NOTE — OCCUPATIONAL THERAPY INITIAL EVALUATION ADULT - ADDITIONAL COMMENTS
Per patient she was fully independent in ADL and ambulation PTA, has RW and cane from recent total hip replacement, no longer using DME.

## 2017-07-21 VITALS
DIASTOLIC BLOOD PRESSURE: 74 MMHG | SYSTOLIC BLOOD PRESSURE: 112 MMHG | TEMPERATURE: 98 F | RESPIRATION RATE: 17 BRPM | HEART RATE: 77 BPM | OXYGEN SATURATION: 97 %

## 2017-07-21 PROCEDURE — 80053 COMPREHEN METABOLIC PANEL: CPT

## 2017-07-21 PROCEDURE — 85730 THROMBOPLASTIN TIME PARTIAL: CPT

## 2017-07-21 PROCEDURE — 80048 BASIC METABOLIC PNL TOTAL CA: CPT

## 2017-07-21 PROCEDURE — 70450 CT HEAD/BRAIN W/O DYE: CPT

## 2017-07-21 PROCEDURE — 85610 PROTHROMBIN TIME: CPT

## 2017-07-21 PROCEDURE — 93306 TTE W/DOPPLER COMPLETE: CPT

## 2017-07-21 PROCEDURE — 70551 MRI BRAIN STEM W/O DYE: CPT

## 2017-07-21 PROCEDURE — 36415 COLL VENOUS BLD VENIPUNCTURE: CPT

## 2017-07-21 PROCEDURE — 85027 COMPLETE CBC AUTOMATED: CPT

## 2017-07-21 PROCEDURE — 80076 HEPATIC FUNCTION PANEL: CPT

## 2017-07-21 PROCEDURE — 83036 HEMOGLOBIN GLYCOSYLATED A1C: CPT

## 2017-07-21 PROCEDURE — 99285 EMERGENCY DEPT VISIT HI MDM: CPT | Mod: 25

## 2017-07-21 PROCEDURE — 99238 HOSP IP/OBS DSCHRG MGMT 30/<: CPT

## 2017-07-21 PROCEDURE — 82553 CREATINE MB FRACTION: CPT

## 2017-07-21 PROCEDURE — 82550 ASSAY OF CK (CPK): CPT

## 2017-07-21 PROCEDURE — 70496 CT ANGIOGRAPHY HEAD: CPT

## 2017-07-21 PROCEDURE — 84484 ASSAY OF TROPONIN QUANT: CPT

## 2017-07-21 PROCEDURE — 93005 ELECTROCARDIOGRAM TRACING: CPT

## 2017-07-21 PROCEDURE — 81001 URINALYSIS AUTO W/SCOPE: CPT

## 2017-07-21 PROCEDURE — 84443 ASSAY THYROID STIM HORMONE: CPT

## 2017-07-21 PROCEDURE — 80061 LIPID PANEL: CPT

## 2017-07-21 PROCEDURE — G0378: CPT

## 2017-07-21 PROCEDURE — 85652 RBC SED RATE AUTOMATED: CPT

## 2017-07-21 PROCEDURE — 70498 CT ANGIOGRAPHY NECK: CPT

## 2017-07-21 PROCEDURE — 97161 PT EVAL LOW COMPLEX 20 MIN: CPT

## 2017-07-21 RX ORDER — PANTOPRAZOLE SODIUM 20 MG/1
1 TABLET, DELAYED RELEASE ORAL
Qty: 30 | Refills: 0 | OUTPATIENT
Start: 2017-07-21 | End: 2017-08-20

## 2017-07-21 RX ORDER — ASPIRIN/CALCIUM CARB/MAGNESIUM 324 MG
1 TABLET ORAL
Qty: 0 | Refills: 0 | COMMUNITY
Start: 2017-07-21

## 2017-07-21 RX ORDER — IBUPROFEN 200 MG
1 TABLET ORAL
Qty: 0 | Refills: 0 | COMMUNITY
Start: 2017-07-21

## 2017-07-21 RX ADMIN — HEPARIN SODIUM 5000 UNIT(S): 5000 INJECTION INTRAVENOUS; SUBCUTANEOUS at 06:11

## 2017-07-21 RX ADMIN — Medication 81 MILLIGRAM(S): at 11:25

## 2017-07-21 RX ADMIN — Medication 1 APPLICATION(S): at 11:25

## 2017-07-21 RX ADMIN — PANTOPRAZOLE SODIUM 40 MILLIGRAM(S): 20 TABLET, DELAYED RELEASE ORAL at 06:12

## 2017-07-21 NOTE — PROGRESS NOTE ADULT - SUBJECTIVE AND OBJECTIVE BOX
Stroke Neurology Follow-Up Visit    No acute events overnight. Went for MRI.   Pt seen and examined.   Reports she feels back to her baseline. No further headaches or dizziness.   Denies nausea, vomiting, slurred speech, difficulty word finding, vision changes, weakness, numbness, or gait imbalance.     Exam:  T(F): 97.5 (07-21-17 @ 05:44), Max: 97.8 (07-20-17 @ 16:23)  HR: 71 (07-21-17 @ 05:44) (71 - 84)  BP: 95/61 (07-21-17 @ 05:44) (95/61 - 131/77)  RR: 16 (07-21-17 @ 05:44) (16 - 16)  SpO2: 100% (07-21-17 @ 05:44) (97% - 100%)    Gen: Sitting up in bed, eating breakfast independently, calm, cooperative, pleasant, in NAD  Neuro:  Mental status: Awake, alert and oriented x3. Follows all commands. Naming, repetition and comprehension intact.  Attention/concentration intact.  No dysarthria, no aphasia.      Cranial nerves: Pupils equally round and reactive to light, 3 mm, visual fields full, no nystagmus, no ptosis, extraocular muscles intact, V1 through V3 intact bilaterally and symmetric, no facial droop, hearing intact to finger rub, palate elevation symmetric, tongue was midline, sternocleidomastoid/shoulder shrug strength bilaterally 5/5.    Motor:  No pronator drift. Normal bulk and tone, strength 5/5 in bilateral upper and lower extremities.   strength strong bilaterally.    Sensation: Intact and symmetric to light touch.  No neglect.   Coordination: No dysmetria on finger-to-nose.  No clumsiness. Rapid alternating movements intact and symmetric.   Reflexes: absent Babinski bilaterally  Gait: deferred at this time     MEDICATIONS  (STANDING):  aspirin enteric coated 81 milliGRAM(s) Oral daily  heparin  Injectable 5000 Unit(s) SubCutaneous every 8 hours  atorvastatin 80 milliGRAM(s) Oral at bedtime  pantoprazole    Tablet 40 milliGRAM(s) Oral before breakfast  hydrocortisone 0.5% Cream 1 Application(s) Topical daily    MEDICATIONS  (PRN):  ALBUTerol/ipratropium for Nebulization 3 milliLiter(s) Nebulizer every 6 hours PRN Shortness of Breath and/or Wheezing  acetaminophen   Tablet. 650 milliGRAM(s) Oral every 6 hours PRN Moderate Pain (4 - 6)  ondansetron Injectable 4 milliGRAM(s) IV Push every 6 hours PRN Nausea and/or Vomiting  guaiFENesin/dextromethorphan  Syrup 5 milliLiter(s) Oral every 4 hours PRN Cough  ibuprofen  Tablet 600 milliGRAM(s) Oral every 4 hours PRN headeache      Labs:                        11.7   6.8   )-----------( 293      ( 20 Jul 2017 06:19 )             36.6     07-20    139  |  103  |  16  ----------------------------<  96  3.7   |  24  |  0.80    Ca    9.1      20 Jul 2017 06:20    TPro  7.3  /  Alb  3.9  /  TBili  1.6<H>  /  DBili  x   /  AST  19  /  ALT  12  /  AlkPhos  73  07-20    LIVER FUNCTIONS - ( 20 Jul 2017 06:20 )  Alb: 3.9 g/dL / Pro: 7.3 g/dL / ALK PHOS: 73 U/L / ALT: 12 U/L / AST: 19 U/L / GGT: x           PT/INR - ( 20 Jul 2017 06:20 )   PT: 12.9 sec;   INR: 1.16     PTT - ( 20 Jul 2017 06:20 )  PTT:34.2 sec    Thyroid Stimulating Hormone, Serum: 0.450 uIU/mL  Cholesterol, Serum: 203 mg/dL  HDL Cholesterol, Serum: 82 mg/dL  Triglycerides, Serum: 78 mg/dL    Hemoglobin A1C, Whole Blood: 5.7 %      Radiology:  7/19 CT Brain Stroke Protocol   IMPRESSION:     No acute intracranial hemorrhage, mass effect or CT evidence of recent   transcortical infarction.     Mild microangiopathic disease.    7/19 CTA head/neck:   IMPRESSION: No large vessel occlusion.  IMPRESSION: Normal CTA of the neck.    7/20 ECHO:  Normal left ventricular size and wall thickness.The left ventricular wall motion is   normal.The left ventricular ejection fraction is estimated to be   60-65%The left atrial size is normal.No evidence for interatrial shunt by color   Doppler assessment.Right atrial size is normal.Injection of agitated saline   contrast documented no interatrial shunt.Structurally normal aortic valve.No   aortic regurgitation noted.Structurally normal mitral valve.There is trace   mitral regurgitation.Structurally normal tricuspid valve.There is trace   tricuspid regurgitation.There is no echocardiographic evidence for pulmonary   hypertension.Structurally normal pulmonic valve.No aortic root   dilatation.There is no pericardial effusion.    7/20 MRI head w/o:   IMPRESSION:  No hydrocephalus, midline shift, acute parenchymal hemorrhage or infarction.   Chronic white matter microangiopathic ischemic disease.      Assessment & Plan:  62 year old female with PMH of asthma, hyperthyroidism, cardiac murmur, anemia, presents to [facility] with transient L eye blurry vision, tongue heaviness, headache, and dizziness. Of note, pt reports L eye blurry vision has occurred 3 times in the past, was told she requires cataract surgery. Admitted to rule out stroke.     -pt back to baseline; no further headache, dizziness, or L eye blurry vision   -likely migraine variant contributing to transient symptoms    -CT head without acute pathology  -CTA head/neck without large vessel occlusion  -ECHO unremarkable study; EF 60-65%, no PFO  -MRI head without evidence of acute stroke  -PT consult - no needs, safe for discharge home when medically ready   -continue aspirin 81 mg for primary stroke prevention   -can discontinue atorvastatin given no stroke on MRI, LDL goal <100; pt counseled on risk factor management for stroke; encouraged regular exercise and healthy eating habits   -can follow up as needed with Dr. Rutherford, neurology, as an outpatient

## 2017-07-24 ENCOUNTER — RESULT REVIEW (OUTPATIENT)
Age: 62
End: 2017-07-24

## 2017-07-24 DIAGNOSIS — K44.9 DIAPHRAGMATIC HERNIA W/OUT OBSTRUCTION OR GANGRENE: ICD-10-CM

## 2017-07-25 DIAGNOSIS — R20.8 OTHER DISTURBANCES OF SKIN SENSATION: ICD-10-CM

## 2017-07-25 DIAGNOSIS — J45.909 UNSPECIFIED ASTHMA, UNCOMPLICATED: ICD-10-CM

## 2017-07-25 DIAGNOSIS — G43.109 MIGRAINE WITH AURA, NOT INTRACTABLE, WITHOUT STATUS MIGRAINOSUS: ICD-10-CM

## 2017-07-25 DIAGNOSIS — M16.11 UNILATERAL PRIMARY OSTEOARTHRITIS, RIGHT HIP: ICD-10-CM

## 2017-07-25 DIAGNOSIS — I67.82 CEREBRAL ISCHEMIA: ICD-10-CM

## 2017-07-25 DIAGNOSIS — D64.9 ANEMIA, UNSPECIFIED: ICD-10-CM

## 2017-07-25 DIAGNOSIS — K21.9 GASTRO-ESOPHAGEAL REFLUX DISEASE WITHOUT ESOPHAGITIS: ICD-10-CM

## 2017-07-25 DIAGNOSIS — H53.8 OTHER VISUAL DISTURBANCES: ICD-10-CM

## 2017-07-28 ENCOUNTER — OUTPATIENT (OUTPATIENT)
Dept: OUTPATIENT SERVICES | Facility: HOSPITAL | Age: 62
LOS: 1 days | Discharge: HOME | End: 2017-07-28
Payer: COMMERCIAL

## 2017-07-28 ENCOUNTER — APPOINTMENT (OUTPATIENT)
Dept: GASTROENTEROLOGY | Facility: HOSPITAL | Age: 62
End: 2017-07-28

## 2017-07-28 DIAGNOSIS — Z98.89 OTHER SPECIFIED POSTPROCEDURAL STATES: Chronic | ICD-10-CM

## 2017-07-28 DIAGNOSIS — Z98.890 OTHER SPECIFIED POSTPROCEDURAL STATES: Chronic | ICD-10-CM

## 2017-07-28 PROCEDURE — 91038 ESOPH IMPED FUNCT TEST > 1HR: CPT | Mod: 26

## 2017-07-28 PROCEDURE — 91010 ESOPHAGUS MOTILITY STUDY: CPT

## 2017-07-28 PROCEDURE — 91010 ESOPHAGUS MOTILITY STUDY: CPT | Mod: 26

## 2017-08-02 DIAGNOSIS — R05 COUGH: ICD-10-CM

## 2017-08-02 DIAGNOSIS — R12 HEARTBURN: ICD-10-CM

## 2017-08-23 ENCOUNTER — APPOINTMENT (OUTPATIENT)
Dept: GASTROENTEROLOGY | Facility: HOSPITAL | Age: 62
End: 2017-08-23

## 2017-09-07 ENCOUNTER — APPOINTMENT (OUTPATIENT)
Dept: GASTROENTEROLOGY | Facility: HOSPITAL | Age: 62
End: 2017-09-07

## 2017-09-07 ENCOUNTER — OUTPATIENT (OUTPATIENT)
Dept: OUTPATIENT SERVICES | Facility: HOSPITAL | Age: 62
LOS: 1 days | Discharge: HOME | End: 2017-09-07
Payer: COMMERCIAL

## 2017-09-07 DIAGNOSIS — Z98.890 OTHER SPECIFIED POSTPROCEDURAL STATES: Chronic | ICD-10-CM

## 2017-09-07 DIAGNOSIS — Z98.89 OTHER SPECIFIED POSTPROCEDURAL STATES: Chronic | ICD-10-CM

## 2017-09-07 PROCEDURE — 43259 EGD US EXAM DUODENUM/JEJUNUM: CPT

## 2017-09-11 DIAGNOSIS — I10 ESSENTIAL (PRIMARY) HYPERTENSION: ICD-10-CM

## 2017-09-11 DIAGNOSIS — K21.9 GASTRO-ESOPHAGEAL REFLUX DISEASE WITHOUT ESOPHAGITIS: ICD-10-CM

## 2017-09-13 ENCOUNTER — APPOINTMENT (OUTPATIENT)
Dept: GASTROENTEROLOGY | Facility: CLINIC | Age: 62
End: 2017-09-13
Payer: COMMERCIAL

## 2017-09-13 VITALS
HEIGHT: 65 IN | BODY MASS INDEX: 19.66 KG/M2 | DIASTOLIC BLOOD PRESSURE: 78 MMHG | TEMPERATURE: 98.4 F | OXYGEN SATURATION: 98 % | RESPIRATION RATE: 14 BRPM | SYSTOLIC BLOOD PRESSURE: 108 MMHG | WEIGHT: 118 LBS | HEART RATE: 77 BPM

## 2017-09-13 PROCEDURE — 99214 OFFICE O/P EST MOD 30 MIN: CPT

## 2017-09-22 ENCOUNTER — APPOINTMENT (OUTPATIENT)
Dept: THORACIC SURGERY | Facility: CLINIC | Age: 62
End: 2017-09-22
Payer: COMMERCIAL

## 2017-09-22 VITALS
OXYGEN SATURATION: 98 % | SYSTOLIC BLOOD PRESSURE: 124 MMHG | TEMPERATURE: 97.7 F | HEIGHT: 64 IN | HEART RATE: 73 BPM | RESPIRATION RATE: 18 BRPM | DIASTOLIC BLOOD PRESSURE: 72 MMHG | WEIGHT: 116 LBS | BODY MASS INDEX: 19.81 KG/M2

## 2017-09-22 PROCEDURE — 99205 OFFICE O/P NEW HI 60 MIN: CPT

## 2017-09-29 ENCOUNTER — OUTPATIENT (OUTPATIENT)
Dept: OUTPATIENT SERVICES | Facility: HOSPITAL | Age: 62
LOS: 1 days | End: 2017-09-29
Payer: COMMERCIAL

## 2017-09-29 DIAGNOSIS — Z98.89 OTHER SPECIFIED POSTPROCEDURAL STATES: Chronic | ICD-10-CM

## 2017-09-29 DIAGNOSIS — Z98.890 OTHER SPECIFIED POSTPROCEDURAL STATES: Chronic | ICD-10-CM

## 2017-09-29 PROCEDURE — 71260 CT THORAX DX C+: CPT | Mod: 26

## 2017-09-29 PROCEDURE — 74177 CT ABD & PELVIS W/CONTRAST: CPT | Mod: 26

## 2017-09-29 PROCEDURE — 71260 CT THORAX DX C+: CPT

## 2017-09-29 PROCEDURE — 74177 CT ABD & PELVIS W/CONTRAST: CPT

## 2017-10-06 ENCOUNTER — APPOINTMENT (OUTPATIENT)
Dept: THORACIC SURGERY | Facility: CLINIC | Age: 62
End: 2017-10-06
Payer: COMMERCIAL

## 2017-10-06 PROCEDURE — 99215 OFFICE O/P EST HI 40 MIN: CPT

## 2017-10-09 VITALS
BODY MASS INDEX: 19.33 KG/M2 | WEIGHT: 116 LBS | TEMPERATURE: 98 F | DIASTOLIC BLOOD PRESSURE: 66 MMHG | HEART RATE: 68 BPM | RESPIRATION RATE: 16 BRPM | SYSTOLIC BLOOD PRESSURE: 102 MMHG | OXYGEN SATURATION: 97 % | HEIGHT: 65 IN

## 2017-12-07 ENCOUNTER — APPOINTMENT (OUTPATIENT)
Dept: PULMONOLOGY | Facility: CLINIC | Age: 62
End: 2017-12-07
Payer: COMMERCIAL

## 2017-12-07 VITALS
DIASTOLIC BLOOD PRESSURE: 70 MMHG | BODY MASS INDEX: 20.14 KG/M2 | OXYGEN SATURATION: 97 % | HEIGHT: 64 IN | WEIGHT: 118 LBS | RESPIRATION RATE: 12 BRPM | SYSTOLIC BLOOD PRESSURE: 116 MMHG | HEART RATE: 84 BPM

## 2017-12-07 DIAGNOSIS — Z23 ENCOUNTER FOR IMMUNIZATION: ICD-10-CM

## 2017-12-07 DIAGNOSIS — R05 COUGH: ICD-10-CM

## 2017-12-07 PROCEDURE — 90686 IIV4 VACC NO PRSV 0.5 ML IM: CPT

## 2017-12-07 PROCEDURE — G0008: CPT

## 2017-12-07 PROCEDURE — 94729 DIFFUSING CAPACITY: CPT

## 2017-12-07 PROCEDURE — 94010 BREATHING CAPACITY TEST: CPT

## 2017-12-07 PROCEDURE — ZZZZZ: CPT

## 2017-12-07 PROCEDURE — 99204 OFFICE O/P NEW MOD 45 MIN: CPT | Mod: 25

## 2017-12-21 ENCOUNTER — OUTPATIENT (OUTPATIENT)
Dept: OUTPATIENT SERVICES | Facility: HOSPITAL | Age: 62
LOS: 1 days | End: 2017-12-21
Payer: COMMERCIAL

## 2017-12-21 DIAGNOSIS — J45.20 MILD INTERMITTENT ASTHMA, UNCOMPLICATED: ICD-10-CM

## 2017-12-21 DIAGNOSIS — Z98.89 OTHER SPECIFIED POSTPROCEDURAL STATES: Chronic | ICD-10-CM

## 2017-12-21 DIAGNOSIS — Z98.890 OTHER SPECIFIED POSTPROCEDURAL STATES: Chronic | ICD-10-CM

## 2017-12-21 PROCEDURE — 94070 EVALUATION OF WHEEZING: CPT | Mod: 26

## 2017-12-21 PROCEDURE — 94070 EVALUATION OF WHEEZING: CPT

## 2018-01-11 ENCOUNTER — RESULT REVIEW (OUTPATIENT)
Age: 63
End: 2018-01-11

## 2018-01-24 ENCOUNTER — LABORATORY RESULT (OUTPATIENT)
Age: 63
End: 2018-01-24

## 2018-02-03 LAB
ACE BLD-CCNC: 65 U/L
ALBUMIN SERPL ELPH-MCNC: 4.1 G/DL
ALP BLD-CCNC: 82 U/L
ALT SERPL-CCNC: 35 U/L
ANION GAP SERPL CALC-SCNC: 13 MMOL/L
AST SERPL-CCNC: 44 U/L
BASOPHILS # BLD AUTO: 0.02 K/UL
BASOPHILS NFR BLD AUTO: 0.5 %
BILIRUB SERPL-MCNC: 1 MG/DL
BUN SERPL-MCNC: 18 MG/DL
CALCIUM SERPL-MCNC: 9.5 MG/DL
CHLORIDE SERPL-SCNC: 104 MMOL/L
CO2 SERPL-SCNC: 25 MMOL/L
CREAT SERPL-MCNC: 0.82 MG/DL
CRP SERPL-MCNC: <0.2 MG/DL
EOSINOPHIL # BLD AUTO: 0.09 K/UL
EOSINOPHIL NFR BLD AUTO: 2.1 %
ERYTHROCYTE [SEDIMENTATION RATE] IN BLOOD BY WESTERGREN METHOD: 18 MM/HR
GLUCOSE SERPL-MCNC: 90 MG/DL
HCT VFR BLD CALC: 37.4 %
HGB BLD-MCNC: 12.2 G/DL
IMM GRANULOCYTES NFR BLD AUTO: 0 %
LYMPHOCYTES # BLD AUTO: 2.87 K/UL
LYMPHOCYTES NFR BLD AUTO: 68.2 %
MAN DIFF?: NORMAL
MCHC RBC-ENTMCNC: 30.5 PG
MCHC RBC-ENTMCNC: 32.6 GM/DL
MCV RBC AUTO: 93.5 FL
MONOCYTES # BLD AUTO: 0.27 K/UL
MONOCYTES NFR BLD AUTO: 6.4 %
NEUTROPHILS # BLD AUTO: 0.96 K/UL
NEUTROPHILS NFR BLD AUTO: 22.8 %
PLATELET # BLD AUTO: 320 K/UL
POTASSIUM SERPL-SCNC: 4.4 MMOL/L
PROT SERPL-MCNC: 7.2 G/DL
RBC # BLD: 4 M/UL
RBC # FLD: 13.8 %
SODIUM SERPL-SCNC: 142 MMOL/L
WBC # FLD AUTO: 4.21 K/UL

## 2018-02-06 ENCOUNTER — APPOINTMENT (OUTPATIENT)
Dept: PULMONOLOGY | Facility: CLINIC | Age: 63
End: 2018-02-06
Payer: COMMERCIAL

## 2018-02-06 VITALS
TEMPERATURE: 98.2 F | HEIGHT: 64 IN | WEIGHT: 119 LBS | DIASTOLIC BLOOD PRESSURE: 66 MMHG | BODY MASS INDEX: 20.32 KG/M2 | SYSTOLIC BLOOD PRESSURE: 90 MMHG | HEART RATE: 76 BPM | OXYGEN SATURATION: 96 %

## 2018-02-06 DIAGNOSIS — J45.20 MILD INTERMITTENT ASTHMA, UNCOMPLICATED: ICD-10-CM

## 2018-02-06 DIAGNOSIS — R05 COUGH: ICD-10-CM

## 2018-02-06 DIAGNOSIS — D86.9 SARCOIDOSIS, UNSPECIFIED: ICD-10-CM

## 2018-02-06 PROCEDURE — ZZZZZ: CPT

## 2018-02-06 PROCEDURE — 94060 EVALUATION OF WHEEZING: CPT

## 2018-02-28 ENCOUNTER — RX RENEWAL (OUTPATIENT)
Age: 63
End: 2018-02-28

## 2018-04-10 ENCOUNTER — APPOINTMENT (OUTPATIENT)
Dept: PULMONOLOGY | Facility: CLINIC | Age: 63
End: 2018-04-10

## 2018-05-08 ENCOUNTER — CLINICAL ADVICE (OUTPATIENT)
Age: 63
End: 2018-05-08

## 2018-05-18 ENCOUNTER — APPOINTMENT (OUTPATIENT)
Dept: INTERNAL MEDICINE | Facility: CLINIC | Age: 63
End: 2018-05-18
Payer: COMMERCIAL

## 2018-05-18 VITALS
WEIGHT: 124 LBS | OXYGEN SATURATION: 99 % | DIASTOLIC BLOOD PRESSURE: 66 MMHG | TEMPERATURE: 97.8 F | HEART RATE: 88 BPM | HEIGHT: 64 IN | SYSTOLIC BLOOD PRESSURE: 107 MMHG | BODY MASS INDEX: 21.17 KG/M2

## 2018-05-18 PROCEDURE — 99213 OFFICE O/P EST LOW 20 MIN: CPT | Mod: GE

## 2018-05-18 RX ORDER — PANTOPRAZOLE 20 MG/1
20 TABLET, DELAYED RELEASE ORAL TWICE DAILY
Qty: 60 | Refills: 11 | Status: DISCONTINUED | COMMUNITY
Start: 2017-12-07 | End: 2018-05-18

## 2018-05-18 RX ORDER — CELECOXIB 200 MG/1
200 CAPSULE ORAL
Qty: 60 | Refills: 2 | Status: DISCONTINUED | COMMUNITY
End: 2018-05-18

## 2018-05-18 NOTE — ASSESSMENT
[FreeTextEntry1] : This is 62yo F w/ PMH chronic progressive right hip pain s/p surgical repairs and a R FLACA (1/26/17), mild intermittent asthma, OA, pre-DM2, and reported hx of prior TIA who is presenting after discharge from [facility] 2 weeks ago for TIA for follow-up.

## 2018-05-18 NOTE — REVIEW OF SYSTEMS
[Fatigue] : fatigue [Redness] : redness [Dryness] : dryness  [Itching] : itching [Nasal Discharge] : nasal discharge [Postnasal Drip] : postnasal drip [Negative] : Heme/Lymph

## 2018-05-18 NOTE — END OF VISIT
[FreeTextEntry3] : 62 yo female with hx preDM, chronic R hip pain here for post hosp dc form [employer] for TIA.\par \par 1) TIA - d/cd on asa, statin but not compliant with statin.  counseled on compliance. refer to neuro\par 2) asthma/allergies - c/w controlled\par 3) preDM - pt will bring a1c from hospital.  if does not bring, check next visit

## 2018-05-18 NOTE — PLAN
[FreeTextEntry1] : #TIA - no re-occurrence of sx since discharge from [facility]; per pt this may have been the third instance of TIA without CVA\par - extensively counseled on importance of secondary prevention using daily ASA 81mg as well as taking lipitor for its plaque-stabilizing benefits; pt is agreeable to taking lipitor as directed\par - neurology referral to establish care as pt was not given one for discharge from [facility]\par - instructed pt to obtain collateral from [facility] including documentation of imaging and if possible, disk containing her images\par \par #Pre-DM2\par - due for A1c but pt was discharged from [facility] 2 weeks ago, she will request laboratory records and bring them to next appt for us to scan to chart\par \par #Seasonal allergies\par - continue cetirizine PRN for allergies\par \par #Mild intermittent asthma - two uses of rescue inhaler in last 2 weeks likely provoked by seasonal allergies; no wheezes today\par - continue breo-ellipta 1 puff daily\par - albuterol rescue inhaler PRN\par \par #Chronic right hip degeneration s/p arthroplasty - due for f/u with Dr. Moon\par - ortho referral given today for pt to make f/u appt\par \par #HCM\par - due for mammo and pap smear; referral for gyn given and ordered mammo\par - remainder of HCM deferred today\par \par RTO 3mos or after establishing with neurology to f/u recs; address HCM at next visit and/or pre-DM2

## 2018-05-18 NOTE — PHYSICAL EXAM
[No Acute Distress] : no acute distress [Well Nourished] : well nourished [Well Developed] : well developed [Well-Appearing] : well-appearing [Normal Voice/Communication] : normal voice/communication [PERRL] : pupils equal round and reactive to light [EOMI] : extraocular movements intact [Conjunctival Hyperemia Bilateral] : hyperemia [Normal Outer Ear/Nose] : the outer ears and nose were normal in appearance [No JVD] : no jugular venous distention [Supple] : supple [No Lymphadenopathy] : no lymphadenopathy [No Respiratory Distress] : no respiratory distress  [Clear to Auscultation] : lungs were clear to auscultation bilaterally [No Accessory Muscle Use] : no accessory muscle use [Normal Rate] : normal rate  [Regular Rhythm] : with a regular rhythm [Normal S1, S2] : normal S1 and S2 [Pedal Pulses Present] : the pedal pulses are present [No Edema] : there was no peripheral edema [Soft] : abdomen soft [Non Tender] : non-tender [Non-distended] : non-distended [Normal Supraclavicular Nodes] : no supraclavicular lymphadenopathy [Normal Posterior Cervical Nodes] : no posterior cervical lymphadenopathy [Normal Anterior Cervical Nodes] : no anterior cervical lymphadenopathy [No CVA Tenderness] : no CVA  tenderness [Grossly Normal Strength/Tone] : grossly normal strength/tone [No Rash] : no rash [Normal Gait] : normal gait [Coordination Grossly Intact] : coordination grossly intact [No Focal Deficits] : no focal deficits [Normal Affect] : the affect was normal [Normal Insight/Judgement] : insight and judgment were intact

## 2018-05-18 NOTE — HEALTH RISK ASSESSMENT
[0] : 2) Feeling down, depressed, or hopeless: Not at all (0) [Intercurrent hospitalizations] : was admitted to the hospital  [[id number]] : 0

## 2018-05-18 NOTE — HISTORY OF PRESENT ILLNESS
[FreeTextEntry1] : follow up from hospital [de-identified] : This is 64yo F w/ PMH chronic progressive right hip pain s/p surgical repairs and a R FLACA (1/26/17), mild intermittent asthma, OA, pre-DM2, and reported hx of prior TIA who is presenting after discharge from [facility] 2 weeks ago for TIA for follow-up.\par \par Per review of discharge summary (to be scanned to chart) pt developed dizziness, blurry vision and focal UE deficits suddenly and was brought to [facility] to r/o CVA; imaging per documentation provided today was unremarkable other than for "narrowing" of blood vessels in the brain without more specifics. Her sx resolved spontaneously and she was diagnosed with TIA and discharged to start lipitor 80mg in addition to her chronic/home meds. \par \par Pt reports feeling exceptionally well since discharge and symptom-free of what she had during the TIA. She has not been compliant with the lipitor as she didn't believe she needed a medication for cholesterol. She complains her allergies have been bothersome recently, and has felt the need to use her rescue inhaler a couple of times in the last two weeks. She denies having been given neurology follow-up after discharge and was told to see PMD first. She requests referral to f/u with her orthopedist Dr. Moon as well as referrals for mammo and pap smear.

## 2018-06-12 ENCOUNTER — RX RENEWAL (OUTPATIENT)
Age: 63
End: 2018-06-12

## 2018-06-13 ENCOUNTER — FORM ENCOUNTER (OUTPATIENT)
Age: 63
End: 2018-06-13

## 2018-06-14 ENCOUNTER — OUTPATIENT (OUTPATIENT)
Dept: OUTPATIENT SERVICES | Facility: HOSPITAL | Age: 63
LOS: 1 days | End: 2018-06-14
Payer: COMMERCIAL

## 2018-06-14 ENCOUNTER — APPOINTMENT (OUTPATIENT)
Dept: ORTHOPEDIC SURGERY | Facility: CLINIC | Age: 63
End: 2018-06-14
Payer: COMMERCIAL

## 2018-06-14 VITALS — WEIGHT: 124 LBS | BODY MASS INDEX: 21.17 KG/M2 | HEIGHT: 64 IN

## 2018-06-14 DIAGNOSIS — Z96.641 PRESENCE OF RIGHT ARTIFICIAL HIP JOINT: ICD-10-CM

## 2018-06-14 DIAGNOSIS — Z98.890 OTHER SPECIFIED POSTPROCEDURAL STATES: Chronic | ICD-10-CM

## 2018-06-14 DIAGNOSIS — Z98.89 OTHER SPECIFIED POSTPROCEDURAL STATES: Chronic | ICD-10-CM

## 2018-06-14 PROCEDURE — 73502 X-RAY EXAM HIP UNI 2-3 VIEWS: CPT | Mod: 26,RT

## 2018-06-14 PROCEDURE — 73502 X-RAY EXAM HIP UNI 2-3 VIEWS: CPT

## 2018-06-14 PROCEDURE — 99213 OFFICE O/P EST LOW 20 MIN: CPT

## 2018-06-14 RX ORDER — POLYETHYLENE GLYCOL 3350, SODIUM CHLORIDE, POTASSIUM CHLORIDE, SODIUM BICARBONATE, AND SODIUM SULFATE 240; 5.84; 2.98; 6.72; 22.72 G/4L; G/4L; G/4L; G/4L; G/4L
240 POWDER, FOR SOLUTION ORAL
Qty: 1 | Refills: 0 | Status: DISCONTINUED | COMMUNITY
Start: 2017-06-22 | End: 2018-06-14

## 2018-06-14 RX ORDER — ALBUTEROL SULFATE 90 UG/1
108 (90 BASE) AEROSOL, METERED RESPIRATORY (INHALATION) EVERY 4 HOURS
Qty: 1 | Refills: 3 | Status: DISCONTINUED | COMMUNITY
Start: 2017-05-15 | End: 2018-06-14

## 2018-07-08 ENCOUNTER — FORM ENCOUNTER (OUTPATIENT)
Age: 63
End: 2018-07-08

## 2018-07-09 ENCOUNTER — APPOINTMENT (OUTPATIENT)
Dept: MAMMOGRAPHY | Facility: HOSPITAL | Age: 63
End: 2018-07-09
Payer: COMMERCIAL

## 2018-07-09 ENCOUNTER — OUTPATIENT (OUTPATIENT)
Dept: OUTPATIENT SERVICES | Facility: HOSPITAL | Age: 63
LOS: 1 days | End: 2018-07-09
Payer: COMMERCIAL

## 2018-07-09 DIAGNOSIS — Z98.890 OTHER SPECIFIED POSTPROCEDURAL STATES: Chronic | ICD-10-CM

## 2018-07-09 DIAGNOSIS — Z98.89 OTHER SPECIFIED POSTPROCEDURAL STATES: Chronic | ICD-10-CM

## 2018-07-09 PROCEDURE — 76641 ULTRASOUND BREAST COMPLETE: CPT

## 2018-07-09 PROCEDURE — 77063 BREAST TOMOSYNTHESIS BI: CPT | Mod: 26

## 2018-07-09 PROCEDURE — 77067 SCR MAMMO BI INCL CAD: CPT | Mod: 26

## 2018-07-09 PROCEDURE — 77067 SCR MAMMO BI INCL CAD: CPT

## 2018-07-09 PROCEDURE — 77063 BREAST TOMOSYNTHESIS BI: CPT

## 2018-07-09 PROCEDURE — 76641 ULTRASOUND BREAST COMPLETE: CPT | Mod: 26,50

## 2018-08-29 ENCOUNTER — APPOINTMENT (OUTPATIENT)
Dept: NEUROLOGY | Facility: CLINIC | Age: 63
End: 2018-08-29
Payer: COMMERCIAL

## 2018-08-29 VITALS
OXYGEN SATURATION: 98 % | HEART RATE: 74 BPM | HEIGHT: 64 IN | SYSTOLIC BLOOD PRESSURE: 118 MMHG | BODY MASS INDEX: 20.14 KG/M2 | TEMPERATURE: 98.1 F | DIASTOLIC BLOOD PRESSURE: 77 MMHG | WEIGHT: 118 LBS

## 2018-08-29 PROCEDURE — 99215 OFFICE O/P EST HI 40 MIN: CPT

## 2018-11-07 ENCOUNTER — APPOINTMENT (OUTPATIENT)
Dept: NEUROLOGY | Facility: CLINIC | Age: 63
End: 2018-11-07

## 2019-01-04 ENCOUNTER — APPOINTMENT (OUTPATIENT)
Dept: INTERNAL MEDICINE | Facility: CLINIC | Age: 64
End: 2019-01-04
Payer: COMMERCIAL

## 2019-01-04 VITALS
OXYGEN SATURATION: 96 % | DIASTOLIC BLOOD PRESSURE: 63 MMHG | TEMPERATURE: 98 F | WEIGHT: 116 LBS | BODY MASS INDEX: 19.81 KG/M2 | HEART RATE: 76 BPM | SYSTOLIC BLOOD PRESSURE: 104 MMHG | HEIGHT: 64 IN

## 2019-01-04 DIAGNOSIS — E78.5 HYPERLIPIDEMIA, UNSPECIFIED: ICD-10-CM

## 2019-01-04 DIAGNOSIS — E07.89 OTHER SPECIFIED DISORDERS OF THYROID: ICD-10-CM

## 2019-01-04 PROCEDURE — 99214 OFFICE O/P EST MOD 30 MIN: CPT | Mod: GC

## 2019-01-07 ENCOUNTER — OTHER (OUTPATIENT)
Age: 64
End: 2019-01-07

## 2019-01-07 LAB
ALBUMIN SERPL ELPH-MCNC: 4.2 G/DL
ALP BLD-CCNC: 73 U/L
ALT SERPL-CCNC: 25 U/L
ANION GAP SERPL CALC-SCNC: 14 MMOL/L
AST SERPL-CCNC: 31 U/L
BILIRUB SERPL-MCNC: 0.8 MG/DL
BUN SERPL-MCNC: 18 MG/DL
CALCIUM SERPL-MCNC: 9.6 MG/DL
CHLORIDE SERPL-SCNC: 105 MMOL/L
CHOLEST SERPL-MCNC: 202 MG/DL
CHOLEST/HDLC SERPL: 3 RATIO
CO2 SERPL-SCNC: 25 MMOL/L
CREAT SERPL-MCNC: 0.73 MG/DL
GLUCOSE SERPL-MCNC: 76 MG/DL
HDLC SERPL-MCNC: 67 MG/DL
LDLC SERPL CALC-MCNC: 119 MG/DL
POTASSIUM SERPL-SCNC: 4.5 MMOL/L
PROT SERPL-MCNC: 7.3 G/DL
SODIUM SERPL-SCNC: 144 MMOL/L
TRIGL SERPL-MCNC: 78 MG/DL
TSH SERPL-ACNC: 0.51 UIU/ML

## 2019-01-18 ENCOUNTER — APPOINTMENT (OUTPATIENT)
Dept: ULTRASOUND IMAGING | Facility: HOSPITAL | Age: 64
End: 2019-01-18

## 2019-02-07 ENCOUNTER — APPOINTMENT (OUTPATIENT)
Dept: GASTROENTEROLOGY | Facility: CLINIC | Age: 64
End: 2019-02-07

## 2019-02-14 ENCOUNTER — FORM ENCOUNTER (OUTPATIENT)
Age: 64
End: 2019-02-14

## 2019-02-15 ENCOUNTER — OUTPATIENT (OUTPATIENT)
Dept: OUTPATIENT SERVICES | Facility: HOSPITAL | Age: 64
LOS: 1 days | End: 2019-02-15
Payer: COMMERCIAL

## 2019-02-15 ENCOUNTER — APPOINTMENT (OUTPATIENT)
Dept: ULTRASOUND IMAGING | Facility: HOSPITAL | Age: 64
End: 2019-02-15
Payer: COMMERCIAL

## 2019-02-15 DIAGNOSIS — Z98.89 OTHER SPECIFIED POSTPROCEDURAL STATES: Chronic | ICD-10-CM

## 2019-02-15 DIAGNOSIS — Z98.890 OTHER SPECIFIED POSTPROCEDURAL STATES: Chronic | ICD-10-CM

## 2019-02-15 PROCEDURE — 76536 US EXAM OF HEAD AND NECK: CPT

## 2019-02-15 PROCEDURE — 76536 US EXAM OF HEAD AND NECK: CPT | Mod: 26

## 2019-04-02 ENCOUNTER — APPOINTMENT (OUTPATIENT)
Dept: INTERNAL MEDICINE | Facility: CLINIC | Age: 64
End: 2019-04-02
Payer: COMMERCIAL

## 2019-04-02 DIAGNOSIS — G43.809 OTHER MIGRAINE, NOT INTRACTABLE, W/OUT STATUS MIGRAINOSUS: ICD-10-CM

## 2019-04-02 PROCEDURE — 36415 COLL VENOUS BLD VENIPUNCTURE: CPT

## 2019-04-02 PROCEDURE — 99214 OFFICE O/P EST MOD 30 MIN: CPT | Mod: GC,25

## 2019-04-03 LAB
ESTIMATED AVERAGE GLUCOSE: 126 MG/DL
HBA1C MFR BLD HPLC: 6 %

## 2019-04-04 ENCOUNTER — APPOINTMENT (OUTPATIENT)
Dept: PULMONOLOGY | Facility: CLINIC | Age: 64
End: 2019-04-04
Payer: COMMERCIAL

## 2019-04-04 VITALS
BODY MASS INDEX: 19.81 KG/M2 | DIASTOLIC BLOOD PRESSURE: 80 MMHG | TEMPERATURE: 95.3 F | HEART RATE: 97 BPM | WEIGHT: 116 LBS | OXYGEN SATURATION: 99 % | SYSTOLIC BLOOD PRESSURE: 120 MMHG | HEIGHT: 64 IN

## 2019-04-04 PROCEDURE — 99213 OFFICE O/P EST LOW 20 MIN: CPT | Mod: 25

## 2019-04-04 PROCEDURE — 94010 BREATHING CAPACITY TEST: CPT

## 2019-04-04 NOTE — ASSESSMENT
[FreeTextEntry1] : Data reviewed:\par \par PFT 12/2017 in office: normal kenyetta and DLCO\par MCT 12/2017 [facility]: borderline BHR, PC20 = 4.4\par [place] 2/6/18: normal, no BD response\par [place] 04/04/2019 : normal\par \par Impression:\par Hiatal hernia, esophagitis, epigastric, chest and back pain\par Asthma by MCT, asymptomatic off meds\par \par Plan:\par Needs to see GI - sent to 4th floor.\par No need to go back on inhalers at this time w clear chest and normal spirometry.

## 2019-04-04 NOTE — PHYSICAL EXAM
[General Appearance - In No Acute Distress] : no acute distress [Heart Rate And Rhythm] : heart rate and rhythm were normal [Heart Sounds] : normal S1 and S2 [Murmurs] : no murmurs present [Auscultation Breath Sounds / Voice Sounds] : lungs were clear to auscultation bilaterally [Abdomen Soft] : soft [Abdomen Tenderness] : non-tender

## 2019-04-04 NOTE — HISTORY OF PRESENT ILLNESS
[FreeTextEntry1] : 04/04/2019: Previously seen by Dr Madrigal a year ago; couldn't wait to see him. Comes in today complaining of chest pain. 4 nights ago she had a typical aura and took sumatriptan without relief of migraine. Then she developed severe epigastric, lower chest and back pain. She is losing weight. Sensation of food sticking, need to vomit. She is off all inhalers and breathing feels fine. Does continue to cough. On no reflux meds - NOT taking pantoprazole or any OTCs. She quit the pantoprazole after she read alarming information about the drug which she can no longer remember. In further chart review and discussion this pain is the same pain she's had since 2017, although the weight loss is new.\par

## 2019-04-04 NOTE — REVIEW OF SYSTEMS
[Recent Wt Loss (___ Lbs)] : recent [unfilled] ~Ulb weight loss [Cough] : cough [Dyspnea] : no dyspnea [Chest Discomfort] : chest discomfort [Reflux] : reflux [Indigestion] : indigestion [Dysphagia] : dysphagia

## 2019-04-30 ENCOUNTER — APPOINTMENT (OUTPATIENT)
Dept: GASTROENTEROLOGY | Facility: CLINIC | Age: 64
End: 2019-04-30
Payer: COMMERCIAL

## 2019-04-30 VITALS
SYSTOLIC BLOOD PRESSURE: 108 MMHG | OXYGEN SATURATION: 98 % | HEART RATE: 78 BPM | DIASTOLIC BLOOD PRESSURE: 60 MMHG | BODY MASS INDEX: 20.32 KG/M2 | RESPIRATION RATE: 14 BRPM | WEIGHT: 119 LBS | HEIGHT: 64 IN

## 2019-04-30 PROCEDURE — 99215 OFFICE O/P EST HI 40 MIN: CPT

## 2019-04-30 NOTE — PHYSICAL EXAM
[General Appearance - Alert] : alert [General Appearance - In No Acute Distress] : in no acute distress [Sclera] : the sclera and conjunctiva were normal [PERRL With Normal Accommodation] : pupils were equal in size, round, and reactive to light [Outer Ear] : the ears and nose were normal in appearance [Oropharynx] : the oropharynx was normal [Neck Appearance] : the appearance of the neck was normal [Respiration, Rhythm And Depth] : normal respiratory rhythm and effort [Heart Rate And Rhythm] : heart rate was normal and rhythm regular [Edema] : there was no peripheral edema [Bowel Sounds] : normal bowel sounds [Abdomen Soft] : soft [Abdomen Tenderness] : non-tender [Abnormal Walk] : normal gait [Skin Color & Pigmentation] : normal skin color and pigmentation [Skin Turgor] : normal skin turgor [] : no rash [Oriented To Time, Place, And Person] : oriented to person, place, and time

## 2019-05-02 NOTE — HISTORY OF PRESENT ILLNESS
[de-identified] : 64F with PMHx HLD, asthma, and GERD presents for evaluation of GERD \par \par Reports that at night, she sometimes cant sleep because she feels like food is stuck in her chest (points to epigastric area). Eats last meal at 5 pm and food being stuck sensation occurs a few hours later. Difficult to lay down at night- has to prop pillows up behind her head. Massages area and does not help. Reports 5 pound weight loss over last few months. \par avoids tomato sauce, caffeine, milk as they are triggers for symptoms. 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 Dodson after and no deemed no surgery necessary \par colonoscopy (2017)- hemorrhoids- repeat 10 years \par EUS(2017)- normal \par \par Fhx: dad  from prostate cancer\par Etoh: none\par Smoking: none\par drug use: none\par NSAID USE: NONE \par \par takes vitamin b12, vitamin E, Cultrate

## 2019-05-29 ENCOUNTER — OUTPATIENT (OUTPATIENT)
Dept: OUTPATIENT SERVICES | Facility: HOSPITAL | Age: 64
LOS: 1 days | Discharge: HOME | End: 2019-05-29
Payer: COMMERCIAL

## 2019-05-29 ENCOUNTER — APPOINTMENT (OUTPATIENT)
Dept: GASTROENTEROLOGY | Facility: HOSPITAL | Age: 64
End: 2019-05-29

## 2019-05-29 ENCOUNTER — RESULT REVIEW (OUTPATIENT)
Age: 64
End: 2019-05-29

## 2019-05-29 DIAGNOSIS — Z98.89 OTHER SPECIFIED POSTPROCEDURAL STATES: Chronic | ICD-10-CM

## 2019-05-29 DIAGNOSIS — Z98.890 OTHER SPECIFIED POSTPROCEDURAL STATES: Chronic | ICD-10-CM

## 2019-05-29 PROCEDURE — 88305 TISSUE EXAM BY PATHOLOGIST: CPT

## 2019-05-29 PROCEDURE — 43239 EGD BIOPSY SINGLE/MULTIPLE: CPT

## 2019-05-30 LAB — SURGICAL PATHOLOGY STUDY: SIGNIFICANT CHANGE UP

## 2019-06-06 ENCOUNTER — RESULT CHARGE (OUTPATIENT)
Age: 64
End: 2019-06-06

## 2019-06-06 ENCOUNTER — RESULT REVIEW (OUTPATIENT)
Age: 64
End: 2019-06-06

## 2019-06-12 ENCOUNTER — APPOINTMENT (OUTPATIENT)
Dept: RADIOLOGY | Facility: HOSPITAL | Age: 64
End: 2019-06-12
Payer: COMMERCIAL

## 2019-06-12 ENCOUNTER — OUTPATIENT (OUTPATIENT)
Dept: OUTPATIENT SERVICES | Facility: HOSPITAL | Age: 64
LOS: 1 days | End: 2019-06-12
Payer: COMMERCIAL

## 2019-06-12 DIAGNOSIS — Z98.89 OTHER SPECIFIED POSTPROCEDURAL STATES: Chronic | ICD-10-CM

## 2019-06-12 DIAGNOSIS — Z98.890 OTHER SPECIFIED POSTPROCEDURAL STATES: Chronic | ICD-10-CM

## 2019-06-12 PROCEDURE — 74220 X-RAY XM ESOPHAGUS 1CNTRST: CPT | Mod: 26

## 2019-06-12 PROCEDURE — 74220 X-RAY XM ESOPHAGUS 1CNTRST: CPT

## 2019-06-20 ENCOUNTER — RESULT REVIEW (OUTPATIENT)
Age: 64
End: 2019-06-20

## 2019-06-21 ENCOUNTER — APPOINTMENT (OUTPATIENT)
Dept: GASTROENTEROLOGY | Facility: CLINIC | Age: 64
End: 2019-06-21

## 2019-07-10 ENCOUNTER — APPOINTMENT (OUTPATIENT)
Dept: GASTROENTEROLOGY | Facility: CLINIC | Age: 64
End: 2019-07-10

## 2019-07-11 ENCOUNTER — APPOINTMENT (OUTPATIENT)
Dept: THORACIC SURGERY | Facility: CLINIC | Age: 64
End: 2019-07-11
Payer: COMMERCIAL

## 2019-07-11 VITALS
TEMPERATURE: 97.2 F | HEIGHT: 64 IN | HEART RATE: 75 BPM | OXYGEN SATURATION: 96 % | BODY MASS INDEX: 20.32 KG/M2 | RESPIRATION RATE: 18 BRPM | DIASTOLIC BLOOD PRESSURE: 73 MMHG | SYSTOLIC BLOOD PRESSURE: 126 MMHG | WEIGHT: 119 LBS

## 2019-07-11 DIAGNOSIS — K44.9 DIAPHRAGMATIC HERNIA W/OUT OBSTRUCTION OR GANGRENE: ICD-10-CM

## 2019-07-11 PROCEDURE — 99214 OFFICE O/P EST MOD 30 MIN: CPT

## 2019-07-11 NOTE — PHYSICAL EXAM
[Neck Appearance] : the appearance of the neck was normal [Neck Cervical Mass (___cm)] : no neck mass was observed [Jugular Venous Distention Increased] : there was no jugular-venous distention [] : no respiratory distress [Apical Impulse] : the apical impulse was normal [Examination Of The Chest] : the chest was normal in appearance [2+] : left 2+ [Abdomen Soft] : soft [No Focal Deficits] : no focal deficits [Abdomen Tenderness] : non-tender

## 2019-07-12 NOTE — HISTORY OF PRESENT ILLNESS
[FreeTextEntry1] : 64 year old female, pmhx of HLD, asthma, sarcoidosis, hyperthyroidism, TIA 2018, presents today for evaluation of hiatal hernia. She was previously evaluated by us back in 9/2017, but hiatal hernia was small and surgery was not indicated. \par \par [employer] pH study was normal 7/11/17\par DeMeester score of 9. No correlation between symptoms & reflux events. \par \par EGD revealed:\par -4 cm grade 2 hiatal hernia and moderate sized paraesophageal hernia. \par -esophagitis with patch of abnormal mucosa status post biopsy. Pathology- squamocolumnar mucosa with reactive changes consistent with moderate to severe esophagitis, negative for metaplasia/dysplasia.\par \par Esophageal Motility study completed on 07/28/17:\par - EGJ Outflow Obstruction\par - Basal LES 49.5, hypertensive and deglutitive relaxation was impaired. \par - Basal .3\par - Residual LES 19.5\par - Residual UES 2.6 \par \par 9/7/17 High-Resolution Esophageal Motility Study with 24 hr pH impedance/EUS with Dr. Garnica 9/7/17:\par -no evidence of extrinsic compression, mass or lymphadenopathy\par -no thickening at EGJ\par \par She had a CT Abd/Pelvis with Oral & IV contrast on 9/29/17 revealing:\par -Small hiatal hernia.\par -No acute abdominal/pelvic pathology\par -upper esophageal sphincter function was hypertensive at rest\par -consistent with EGJ outflow obstruction \par \par EUS completed with 09/7/2017:\par -no evidence of extrinsic compression, mass or lymphadenopathy \par -layers of the EGJ were normal without thickening \par \par EGD completed on 05/29/19 by Dr Mccallum: \par -esophagitis s/p biopsy\par -6 cm hiatal hernia\par -mild gastritis s/p biopsy\par \par Barium Esophagram completed on 06/12/19:\par -small hiatal hernia \par \par She is referred back by Dr Mccallum for evaluation of hiatal hernia and to discuss possible surgery. She reports chest pressure, food sticking sensation when eating large bites of food and oftentimes after eating late dinners. Pressure relieved by sitting upright. Patient tolerating PO. Denies abdominal pain, nausea, vomiting, diarrhea, constipation. \par \par

## 2019-07-12 NOTE — ASSESSMENT
[FreeTextEntry1] : 64 year old female, pmhx of HLD, asthma, sarcoidosis, hyperthyroidism, TIA 2018, presents today for evaluation of hiatal hernia. She was previously evaluated by us back in 9/2017, but hiatal hernia was small and surgery was not indicated. She complains of worsening chest pressure and food sticking sensation associated with eating large bites of food. Now back to discuss possible surgery. Denies acid reflux, abdominal pain, n/v. \par \par Previous 2017 pH and manometry results reviewed again with patient. Previous basal LES noted to be hypertensive. Hiatal hernia appears to have grown bigger and patient now more symptomatic since 2 years ago. Will obtain new pH/manometry studies. \par \par I have reviewed the patient's medical records and diagnostic images at the time of this office consultation and have made the following recommendation.\par Plan:\par 1. Repeat pH/manometry with Dr Martino\par 2. RTC after to discuss surgery

## 2019-08-02 ENCOUNTER — OUTPATIENT (OUTPATIENT)
Dept: OUTPATIENT SERVICES | Facility: HOSPITAL | Age: 64
LOS: 1 days | Discharge: HOME | End: 2019-08-02
Payer: COMMERCIAL

## 2019-08-02 ENCOUNTER — RESULT REVIEW (OUTPATIENT)
Age: 64
End: 2019-08-02

## 2019-08-02 DIAGNOSIS — Z98.89 OTHER SPECIFIED POSTPROCEDURAL STATES: Chronic | ICD-10-CM

## 2019-08-02 DIAGNOSIS — Z98.890 OTHER SPECIFIED POSTPROCEDURAL STATES: Chronic | ICD-10-CM

## 2019-08-02 PROCEDURE — 91035 G-ESOPH REFLX TST W/ELECTROD: CPT

## 2019-08-02 PROCEDURE — 43239 EGD BIOPSY SINGLE/MULTIPLE: CPT

## 2019-08-02 PROCEDURE — 88305 TISSUE EXAM BY PATHOLOGIST: CPT

## 2019-08-02 PROCEDURE — 91013 ESOPHGL MOTIL W/STIM/PERFUS: CPT | Mod: 26

## 2019-08-02 PROCEDURE — 91010 ESOPHAGUS MOTILITY STUDY: CPT | Mod: 26

## 2019-08-02 PROCEDURE — 91035 G-ESOPH REFLX TST W/ELECTROD: CPT | Mod: 26

## 2019-08-02 PROCEDURE — C1889: CPT

## 2019-08-07 LAB — SURGICAL PATHOLOGY STUDY: SIGNIFICANT CHANGE UP

## 2019-08-12 ENCOUNTER — APPOINTMENT (OUTPATIENT)
Dept: INTERNAL MEDICINE | Facility: CLINIC | Age: 64
End: 2019-08-12
Payer: COMMERCIAL

## 2019-08-12 VITALS
TEMPERATURE: 97.1 F | BODY MASS INDEX: 19.81 KG/M2 | HEIGHT: 64 IN | DIASTOLIC BLOOD PRESSURE: 74 MMHG | SYSTOLIC BLOOD PRESSURE: 110 MMHG | HEART RATE: 73 BPM | OXYGEN SATURATION: 99 % | WEIGHT: 116 LBS

## 2019-08-12 DIAGNOSIS — N64.4 MASTODYNIA: ICD-10-CM

## 2019-08-12 DIAGNOSIS — Z13.820 ENCOUNTER FOR SCREENING FOR OSTEOPOROSIS: ICD-10-CM

## 2019-08-12 PROCEDURE — 99214 OFFICE O/P EST MOD 30 MIN: CPT | Mod: GC

## 2019-08-12 RX ORDER — ASPIRIN 81 MG/1
81 TABLET ORAL DAILY
Refills: 0 | Status: DISCONTINUED | COMMUNITY
End: 2019-08-12

## 2019-08-12 RX ORDER — ALBUTEROL SULFATE 2.5 MG/3ML
(2.5 MG/3ML) SOLUTION RESPIRATORY (INHALATION)
Qty: 1 | Refills: 3 | Status: DISCONTINUED | COMMUNITY
Start: 2017-05-15 | End: 2019-08-12

## 2019-08-12 RX ORDER — CETIRIZINE HYDROCHLORIDE 10 MG/1
10 TABLET, COATED ORAL
Qty: 30 | Refills: 1 | Status: DISCONTINUED | COMMUNITY
Start: 2017-05-15 | End: 2019-08-12

## 2019-08-12 RX ORDER — FLUTICASONE FUROATE AND VILANTEROL TRIFENATATE 200; 25 UG/1; UG/1
200-25 POWDER RESPIRATORY (INHALATION)
Qty: 1 | Refills: 2 | Status: DISCONTINUED | COMMUNITY
Start: 2018-02-06 | End: 2019-08-12

## 2019-08-12 RX ORDER — ATORVASTATIN CALCIUM 80 MG/1
80 TABLET, FILM COATED ORAL
Refills: 0 | Status: DISCONTINUED | COMMUNITY
End: 2019-08-12

## 2019-08-22 ENCOUNTER — APPOINTMENT (OUTPATIENT)
Dept: THORACIC SURGERY | Facility: CLINIC | Age: 64
End: 2019-08-22
Payer: COMMERCIAL

## 2019-08-22 VITALS
OXYGEN SATURATION: 98 % | WEIGHT: 118 LBS | HEIGHT: 64 IN | BODY MASS INDEX: 20.14 KG/M2 | SYSTOLIC BLOOD PRESSURE: 107 MMHG | TEMPERATURE: 97.3 F | RESPIRATION RATE: 18 BRPM | DIASTOLIC BLOOD PRESSURE: 67 MMHG | HEART RATE: 86 BPM

## 2019-08-22 DIAGNOSIS — R10.13 EPIGASTRIC PAIN: ICD-10-CM

## 2019-08-22 PROCEDURE — 99214 OFFICE O/P EST MOD 30 MIN: CPT

## 2019-08-22 NOTE — PHYSICAL EXAM
[] : no respiratory distress [Respiration, Rhythm And Depth] : normal respiratory rhythm and effort [Exaggerated Use Of Accessory Muscles For Inspiration] : no accessory muscle use [Apical Impulse] : the apical impulse was normal [Auscultation Breath Sounds / Voice Sounds] : lungs were clear to auscultation bilaterally [Heart Rate And Rhythm] : heart rate was normal and rhythm regular [Heart Sounds] : normal S1 and S2 [Examination Of The Chest] : the chest was normal in appearance [2+] : left 2+ [Abnormal Walk] : normal gait [Oriented To Time, Place, And Person] : oriented to person, place, and time

## 2019-08-23 NOTE — ASSESSMENT
[FreeTextEntry1] : 64 year old female, pmhx of HLD, asthma, sarcoidosis, hyperthyroidism, TIA 2018, with abdominal pain in the center of her chest after eating. \par \par Patient is doing generally well today but still with sensations of chest and abdominal pressure, worse with laying down and after PO intake. She states that she is experiencing constipation daily.\par \par I reviewed the patient's recent EGD, Manometry/pH testing results which were all WNL. EGD does reveal a small 1cm hiatal hernia but patient is asymptomatic for any acid reflux/heartburn (DeMeester Score of 4.9) and I do not feel that repair of hiatal hernia will help her current symptoms. I suspect that symptoms may be related to her lower GI tract and should be evaluated by Dr. Mccallum. \par \par I have reviewed the patient's medical records and diagnostic images at the time of this office visit and have made the following recommendations\par Plan:\par 1.Follow up with  for further GI evaluation. \par

## 2019-08-23 NOTE — ADDENDUM
[FreeTextEntry1] : Documented by Kiel Olvera acting as a scribe for Dr. Willie Dodson on 08/22/2019\par

## 2019-08-23 NOTE — HISTORY OF PRESENT ILLNESS
[FreeTextEntry1] : 64 year old female, pmhx of HLD, asthma, sarcoidosis, hyperthyroidism, TIA 2018, presents today for evaluation of hiatal hernia. She was previously evaluated by us back in 9/2017, but hiatal hernia was small and surgery was not indicated. She presents today to review the repeat pH/Manometry testing.  \par \par [employer] pH study was normal 7/11/17\par DeMeester score of 9. No correlation between symptoms & reflux events. \par \par EGD revealed:\par -4 cm grade 2 hiatal hernia and moderate sized paraesophageal hernia. \par -esophagitis with patch of abnormal mucosa status post biopsy. Pathology- squamocolumnar mucosa with reactive changes consistent with moderate to severe esophagitis, negative for metaplasia/dysplasia.\par \par Esophageal Motility study completed on 07/28/17:\par - EGJ Outflow Obstruction\par - Basal LES 49.5, hypertensive and deglutitive relaxation was impaired. \par - Basal .3\par - Residual LES 19.5\par - Residual UES 2.6 \par \par 9/7/17 High-Resolution Esophageal Motility Study with 24 hr pH impedance/EUS with Dr. Garnica 9/7/17:\par -no evidence of extrinsic compression, mass or lymphadenopathy\par -no thickening at EGJ\par \par She had a CT Abd/Pelvis with Oral & IV contrast on 9/29/17 revealing:\par -Small hiatal hernia.\par -No acute abdominal/pelvic pathology\par -upper esophageal sphincter function was hypertensive at rest\par -consistent with EGJ outflow obstruction \par \par EUS completed with 09/7/2017:\par -no evidence of extrinsic compression, mass or lymphadenopathy \par -layers of the EGJ were normal without thickening \par \par EGD completed on 05/29/19 by Dr Mccallum: \par -esophagitis s/p biopsy\par -6 cm hiatal hernia\par -mild gastritis s/p biopsy\par \par Barium Esophagram completed on 06/12/19:\par -small hiatal hernia \par \par Manometry testing completed on 08/02/19:\par -normal esophageal contraction\par -appropriate LES relaxation in the upper limit of normal \par \par EGD completed on 08/02/19:\par -1cm hiatal hernia\par -Hill grade 4 valve\par \par pH testing completed on 08/02/19:\par -DeMeester score of 4.9\par \par \par

## 2019-09-18 ENCOUNTER — APPOINTMENT (OUTPATIENT)
Dept: MAMMOGRAPHY | Facility: HOSPITAL | Age: 64
End: 2019-09-18

## 2019-09-18 ENCOUNTER — APPOINTMENT (OUTPATIENT)
Dept: ULTRASOUND IMAGING | Facility: HOSPITAL | Age: 64
End: 2019-09-18

## 2019-09-24 ENCOUNTER — FORM ENCOUNTER (OUTPATIENT)
Age: 64
End: 2019-09-24

## 2019-09-25 ENCOUNTER — OUTPATIENT (OUTPATIENT)
Dept: OUTPATIENT SERVICES | Facility: HOSPITAL | Age: 64
LOS: 1 days | End: 2019-09-25
Payer: COMMERCIAL

## 2019-09-25 ENCOUNTER — APPOINTMENT (OUTPATIENT)
Dept: MAMMOGRAPHY | Facility: HOSPITAL | Age: 64
End: 2019-09-25
Payer: COMMERCIAL

## 2019-09-25 ENCOUNTER — APPOINTMENT (OUTPATIENT)
Dept: ULTRASOUND IMAGING | Facility: HOSPITAL | Age: 64
End: 2019-09-25
Payer: COMMERCIAL

## 2019-09-25 DIAGNOSIS — Z98.890 OTHER SPECIFIED POSTPROCEDURAL STATES: Chronic | ICD-10-CM

## 2019-09-25 DIAGNOSIS — Z98.89 OTHER SPECIFIED POSTPROCEDURAL STATES: Chronic | ICD-10-CM

## 2019-09-25 PROCEDURE — 77067 SCR MAMMO BI INCL CAD: CPT | Mod: 26

## 2019-09-25 PROCEDURE — 77063 BREAST TOMOSYNTHESIS BI: CPT | Mod: 26

## 2019-09-25 PROCEDURE — 76641 ULTRASOUND BREAST COMPLETE: CPT

## 2019-09-25 PROCEDURE — 76641 ULTRASOUND BREAST COMPLETE: CPT | Mod: 26,50

## 2019-09-25 PROCEDURE — 77067 SCR MAMMO BI INCL CAD: CPT

## 2019-09-25 PROCEDURE — 77063 BREAST TOMOSYNTHESIS BI: CPT

## 2019-10-01 ENCOUNTER — APPOINTMENT (OUTPATIENT)
Dept: INTERNAL MEDICINE | Facility: CLINIC | Age: 64
End: 2019-10-01
Payer: COMMERCIAL

## 2019-10-01 VITALS
WEIGHT: 117 LBS | DIASTOLIC BLOOD PRESSURE: 63 MMHG | TEMPERATURE: 97.2 F | HEART RATE: 70 BPM | HEIGHT: 64 IN | BODY MASS INDEX: 19.97 KG/M2 | SYSTOLIC BLOOD PRESSURE: 96 MMHG | OXYGEN SATURATION: 97 %

## 2019-10-01 DIAGNOSIS — D64.9 ANEMIA, UNSPECIFIED: ICD-10-CM

## 2019-10-01 DIAGNOSIS — E05.90 THYROTOXICOSIS, UNSPECIFIED W/OUT THYROTOXIC CRISIS OR STORM: ICD-10-CM

## 2019-10-01 PROCEDURE — 99214 OFFICE O/P EST MOD 30 MIN: CPT | Mod: GC,25

## 2019-10-01 PROCEDURE — 36415 COLL VENOUS BLD VENIPUNCTURE: CPT

## 2019-10-01 RX ORDER — SUMATRIPTAN 50 MG/1
50 TABLET, FILM COATED ORAL
Qty: 12 | Refills: 2 | Status: DISCONTINUED | COMMUNITY
Start: 2018-08-29 | End: 2019-10-01

## 2019-10-01 RX ORDER — ACETAMINOPHEN 650 MG/1
650 TABLET, EXTENDED RELEASE ORAL EVERY 8 HOURS
Qty: 30 | Refills: 0 | Status: ACTIVE | COMMUNITY
Start: 2019-10-01 | End: 1900-01-01

## 2019-10-03 ENCOUNTER — APPOINTMENT (OUTPATIENT)
Dept: NEUROLOGY | Facility: CLINIC | Age: 64
End: 2019-10-03
Payer: COMMERCIAL

## 2019-10-03 VITALS
OXYGEN SATURATION: 99 % | DIASTOLIC BLOOD PRESSURE: 83 MMHG | WEIGHT: 119 LBS | HEART RATE: 72 BPM | SYSTOLIC BLOOD PRESSURE: 121 MMHG | TEMPERATURE: 97.7 F | BODY MASS INDEX: 20.32 KG/M2 | HEIGHT: 64 IN

## 2019-10-03 PROCEDURE — 99214 OFFICE O/P EST MOD 30 MIN: CPT

## 2019-10-03 NOTE — DISCUSSION/SUMMARY
[FreeTextEntry1] : 64-year-old woman with complicated migraines, who presents following a typical migraine (unilateral left-sided headache, left arm numbness, nausea, heart racing, tongue heaviness), now resolved.  Since she has never had neurologic deficits in the absence of headache, I suspect that all of her prior episodes may be migraines, and I am not certain that she has ever had a TIA/stroke.  The only current aspect of her history and exam that raises exam for prior stroke is subtle left upper extremity weakness, evident on orbiting only -- would recommend MRI to look for evidence of prior infarct that may explain this deficit, as well as MRA to evaluate intracranial and extracranial circulation as she reports that narrowing of a blood vessel was noted on a prior study.  If no stroke or vessel abnormality noted, would recommend stopping aspirin.  If stroke is present, would continue aspirin and restart statin (119 is at goal for patients without stroke, but would target LDL <70 if stroke noted).\par \par # Migraine\par -Continue Tylenol 650 mg PRN at migraine onset, okay to escalate to sumatriptan 25 or 50 mg if needed, patient aware that she should not use this medication more than 10 times per month but reports that she only uses a few times a year\par -No indication for daily preventative treatment at this time\par \par # Possible stroke\par -MRI/MRA head and neck\par -Continue aspirin 81 mg daily for now pending result\par -Consider restarting statin pending result\par -If stroke noted on MRI, will likely need additional stroke work up depending on imaging appearance, will defer to Dr. Harper\par \par RTC 3 months with Dr. Harper

## 2019-10-03 NOTE — HISTORY OF PRESENT ILLNESS
[FreeTextEntry1] : 64-year-old woman with migraines +/- possible TIAs, followed by Dr. Harper, who presents for urgent follow up in the setting of recent severe migraine with left arm numbness.\par \par Patient reports that she gets migraines 1-2x/year.  They always start with flashing stars in her left field of vision, followed by severe pain in the left side of her head associated with nausea, heart racing, numbness, and sometimes tongue heaviness or trouble getting words out.  She reports that she has never had numbness, weakness, tingling, tongue heaviness, or speech impairment without headache.  The migraine she reported at PCP visit last week has new resolved, including all associated symptoms.\par \par She states that she takes sumaptriptan 50 mg PRN when she gets these migraines a few times per year -- there is some confusion about this as most recent PCP note reports that she takes 50 mg BID every day, which she denies.  Sumatriptan 25 mg PRN and Tylenol 650 mg PRN were prescribed at this visit.\par \par Of note during a migraine in May 2018 she presented to [facility].  Note from that visit states she had RIGHT headache at that time.  She had an MRI/MRA that showed no acute or prior infarct, mild stenosis of L MCA and its branches, and hypoplastic right vertebral artery.  She was started on aspirin 81 mg and atorvastatin 80 mg.  She is no longer taking atorvastatin and takes aspirin intermittently.

## 2019-10-03 NOTE — PHYSICAL EXAM
[FreeTextEntry1] : Constitutional: no apparent distress\par Psychiatric: normal affect, euthyhmic, alert and oriented x 3\par HEENT: moist mucous membranes, oropharynx clear\par Neck: supple, no lymphadenopathy\par Musculoskeletal: extremities warm, well-perfused, normal range of motion\par Skin: no rashes, bruises, or lesions\par \par Neurologic Exam:\par Mental Status: awake and alert, oriented to time, place, and person, attention intact, memory intact, speech fluent and prosodic with no paraphasic errors, repetition intact, follows simple and complex commands, normal fund of knowledge\par Cranial Nerves: I: deferred; II: pupils equal round and reactive, visual fields full to confrontation; III, IV, VI: extraocular movements full with no nystagmus; V: facial sensation intact and symmetric; VII: facial power symmetric; VIII: hearing intact to finger rub; IX/X: palate elevates symmetrically, no dysarthria; XI: shoulder shrug symmetric; XII: tongue protrudes midline\par Motor: normal bulk and tone, right arm orbits around the left, power 5/5 to confrontation throughout including shoulder abduction, elbow flexion and extension, wrist flexion and extension, hip flexion, knee flexion and extension, no abnormal movements\par Sensation: intact to light touch in distal upper and lower extremities bilaterally\par Coordination: finger-nose-finger intact bilaterally\par Reflexes: 2+ biceps, triceps, brachioradialis, patella.  Toes downgoing bilaterally, no clonus\par Gait: narrow base, normal stance and stride, normal arm swing

## 2019-10-04 LAB
ANION GAP SERPL CALC-SCNC: 14 MMOL/L
BASOPHILS # BLD AUTO: 0.04 K/UL
BASOPHILS NFR BLD AUTO: 0.7 %
BUN SERPL-MCNC: 15 MG/DL
CALCIUM SERPL-MCNC: 9.7 MG/DL
CHLORIDE SERPL-SCNC: 103 MMOL/L
CO2 SERPL-SCNC: 25 MMOL/L
CREAT SERPL-MCNC: 0.82 MG/DL
EOSINOPHIL # BLD AUTO: 0.08 K/UL
EOSINOPHIL NFR BLD AUTO: 1.3 %
GLUCOSE SERPL-MCNC: 96 MG/DL
HCT VFR BLD CALC: 42.2 %
HGB BLD-MCNC: 13.1 G/DL
IMM GRANULOCYTES NFR BLD AUTO: 0 %
LYMPHOCYTES # BLD AUTO: 3.75 K/UL
LYMPHOCYTES NFR BLD AUTO: 63.2 %
MAN DIFF?: NORMAL
MCHC RBC-ENTMCNC: 29.8 PG
MCHC RBC-ENTMCNC: 31 GM/DL
MCV RBC AUTO: 95.9 FL
MONOCYTES # BLD AUTO: 0.42 K/UL
MONOCYTES NFR BLD AUTO: 7.1 %
NEUTROPHILS # BLD AUTO: 1.64 K/UL
NEUTROPHILS NFR BLD AUTO: 27.7 %
PLATELET # BLD AUTO: 301 K/UL
POTASSIUM SERPL-SCNC: 4.2 MMOL/L
RBC # BLD: 4.4 M/UL
RBC # FLD: 12.9 %
SODIUM SERPL-SCNC: 142 MMOL/L
TSH SERPL-ACNC: 0.65 UIU/ML
WBC # FLD AUTO: 5.93 K/UL

## 2019-10-08 ENCOUNTER — OTHER (OUTPATIENT)
Age: 64
End: 2019-10-08

## 2019-10-21 ENCOUNTER — OTHER (OUTPATIENT)
Age: 64
End: 2019-10-21

## 2019-10-23 ENCOUNTER — OTHER (OUTPATIENT)
Age: 64
End: 2019-10-23

## 2019-10-25 ENCOUNTER — APPOINTMENT (OUTPATIENT)
Dept: INTERNAL MEDICINE | Facility: CLINIC | Age: 64
End: 2019-10-25
Payer: COMMERCIAL

## 2019-10-25 VITALS
TEMPERATURE: 97.9 F | HEART RATE: 74 BPM | SYSTOLIC BLOOD PRESSURE: 118 MMHG | DIASTOLIC BLOOD PRESSURE: 75 MMHG | OXYGEN SATURATION: 99 % | RESPIRATION RATE: 12 BRPM

## 2019-10-25 DIAGNOSIS — Z28.21 IMMUNIZATION NOT CARRIED OUT BECAUSE OF PATIENT REFUSAL: ICD-10-CM

## 2019-10-25 DIAGNOSIS — Z99.89 DEPENDENCE ON OTHER ENABLING MACHINES AND DEVICES: ICD-10-CM

## 2019-10-25 DIAGNOSIS — M16.11 UNILATERAL PRIMARY OSTEOARTHRITIS, RIGHT HIP: ICD-10-CM

## 2019-10-25 DIAGNOSIS — R25.1 TREMOR, UNSPECIFIED: ICD-10-CM

## 2019-10-25 PROCEDURE — 99213 OFFICE O/P EST LOW 20 MIN: CPT | Mod: GC

## 2019-10-25 RX ORDER — PANTOPRAZOLE 40 MG/1
40 TABLET, DELAYED RELEASE ORAL
Qty: 90 | Refills: 3 | Status: COMPLETED | COMMUNITY
Start: 2019-04-30 | End: 2019-10-25

## 2019-10-25 RX ORDER — SUMATRIPTAN 25 MG/1
25 TABLET, FILM COATED ORAL
Qty: 12 | Refills: 0 | Status: COMPLETED | COMMUNITY
Start: 2019-10-01 | End: 2019-10-01

## 2019-10-25 NOTE — ASSESSMENT
[FreeTextEntry1] : #Migraine w/ aura: Pt w/ hx of chronic migraines (about 2-3/year), No signs of TIA/stroke as pt w/ muscle strength and sensation intact, cn 2-12 intact\par -pt w/ f/u w/ her neurologist Dr. Harper\par -pt to receive MRA/MRI of brain and orbit to r/o CASSIDY/RVO and amaurosis fugax from ICA emboli\par -pt only on tylenol 650mg PRN and d/c sumatriptan on last visit once ischemic neurological disease is ruled out\par -spoke w/pt regarding warning signs of stroke including facial droop, dysarthria, dysphagia, weakness/paralysis, ataxic gait, and apraxia\par -c/w migralief\par \par #TIA - Pt w/ hx of TIA\par -c/w ASA 81mg d/t to hx of L MCA stenosis on MRI\par -see plan above\par \par #Left fibroadenoma - hx of breast pain (without mass and erythema)\par -mammogram in Sep 19' shows left fibroadenoma with recommendation of yearly f/u\par -pain control with tylenol\par \par #mild intermittent ashtma- no recent asthma episodes, lung exam unremarkable\par -Proair HFA 90mcg PRN \par \par #HCM\par -ASCVD  4.93%\par -refused influenza vaccine despite counseling on benefits vs risks\par -colonoscopy in 17' WNL\par -Lipid panel Jan 19' T. Cholesterol 202\par -RTC 2 months

## 2019-10-25 NOTE — HISTORY OF PRESENT ILLNESS
[FreeTextEntry1] : f/u visit and filling out MTA reduced fair form [de-identified] : 64 y.o F PMH GERD, hiatal hernia, OA, right hip replacement, mild intermittent asthma, prediabetes, TIA, chronic migraines, and intermittent loss of vision in the left eye presents for f/u visit and completion of MTA reduced fair form. Last monday when coming off the train, the pt had an episode of palpitations with mild left arm discomfort similar to her sxs during a migraine attack, however, the pt did not have her typical aura sxs nor any headache. The palpitations resolved with water and have not occurred since then. Dr. Harper f/w pt and has requested her complete MRI/MRA of the carotids with brain/orbits to evaluate for amaurosis fugax/retinal vein/artery occlusion. On ROS, the pt currently denies fevers/chills, neck pain/stiffness, headache, N/V/D, chest pain/discomfort, palpitations, orthopnea, PND, legg swelling, hematochezia/melena, urinary sxs, or joint pain.

## 2019-10-29 ENCOUNTER — OUTPATIENT (OUTPATIENT)
Dept: OUTPATIENT SERVICES | Facility: HOSPITAL | Age: 64
LOS: 1 days | End: 2019-10-29
Payer: COMMERCIAL

## 2019-10-29 ENCOUNTER — RESULT REVIEW (OUTPATIENT)
Age: 64
End: 2019-10-29

## 2019-10-29 ENCOUNTER — APPOINTMENT (OUTPATIENT)
Dept: RADIOLOGY | Facility: HOSPITAL | Age: 64
End: 2019-10-29
Payer: COMMERCIAL

## 2019-10-29 DIAGNOSIS — Z98.890 OTHER SPECIFIED POSTPROCEDURAL STATES: Chronic | ICD-10-CM

## 2019-10-29 DIAGNOSIS — Z98.89 OTHER SPECIFIED POSTPROCEDURAL STATES: Chronic | ICD-10-CM

## 2019-10-29 PROCEDURE — 73522 X-RAY EXAM HIPS BI 3-4 VIEWS: CPT

## 2019-10-29 PROCEDURE — 72100 X-RAY EXAM L-S SPINE 2/3 VWS: CPT

## 2019-10-29 PROCEDURE — 77080 DXA BONE DENSITY AXIAL: CPT

## 2019-10-29 PROCEDURE — 77080 DXA BONE DENSITY AXIAL: CPT | Mod: 26

## 2019-10-29 PROCEDURE — 72100 X-RAY EXAM L-S SPINE 2/3 VWS: CPT | Mod: 26

## 2019-10-29 PROCEDURE — 73522 X-RAY EXAM HIPS BI 3-4 VIEWS: CPT | Mod: 26

## 2019-10-31 ENCOUNTER — APPOINTMENT (OUTPATIENT)
Dept: ORTHOPEDIC SURGERY | Facility: CLINIC | Age: 64
End: 2019-10-31
Payer: COMMERCIAL

## 2019-10-31 VITALS — BODY MASS INDEX: 20.32 KG/M2 | HEIGHT: 64 IN | WEIGHT: 119 LBS

## 2019-10-31 DIAGNOSIS — Z47.1 AFTERCARE FOLLOWING JOINT REPLACEMENT SURGERY: ICD-10-CM

## 2019-10-31 DIAGNOSIS — M47.816 SPONDYLOSIS W/OUT MYELOPATHY OR RADICULOPATHY, LUMBAR REGION: ICD-10-CM

## 2019-10-31 DIAGNOSIS — Z96.641 AFTERCARE FOLLOWING JOINT REPLACEMENT SURGERY: ICD-10-CM

## 2019-10-31 PROCEDURE — 99213 OFFICE O/P EST LOW 20 MIN: CPT

## 2019-10-31 RX ORDER — B2/MAGNESIUM CIT,OXID/FEVERFEW 200-180-50
200-180-50 TABLET ORAL
Qty: 30 | Refills: 2 | Status: DISCONTINUED | COMMUNITY
Start: 2019-10-01 | End: 2019-10-31

## 2019-10-31 NOTE — HISTORY OF PRESENT ILLNESS
[de-identified] : 64 year old F presents today for follow up evaluation of left hip area and low back pain that began a few months ago. She reports slight, occasional left hip pain localized to the side of the hip that occurs with activity and after prolonged sitting. She denies numbness and tingling in her bilateral legs but reports that her left leg feels weak and as if it might buckle while she's walking. Patient can walk 5-10 blocks with a slight limp. She uses a rail to ascend and descend stairs.  She can enter public transportation but reports that she cannot sit for a prolonged period in an ordinary chair.\par Ms. Clement is 2 years s/p right THR and doing well.

## 2019-10-31 NOTE — PHYSICAL EXAM
[de-identified] : Well appearing. NAD. Alert and oriented x 3. No respiratory distress.\par Bilateral upper extremities: Skin intact. No deformity. Painless active ROM without evident restriction.\par Cervical, thoracic and lumbar spine: No visible deformity. Painless active ROM without evident restriction. (+) Radicular pain on passive straight leg raise on the right.\par \par Pelvis: No pelvic obliquity. No tenderness.\par Leg lengths: Equal within a mm.\par \par Gait: Normal.\par Ambulatory assist devices: None.\par \par Right Hip:\par Skin intact. (+) Well healed surgical scar. No erythema. No ecchymosis. No swelling. No deformity. No focal tenderness.\par Painless ROM from full extension to 115-120 degrees of flexion. 40-45 degrees of internal rotation. 60 degrees of external rotation. 60 degrees of abduction. 20-25 degrees of adduction.\par ALAN painless. Impingement (FADIR) painless. Stinchfield painless. No crepitation. No instability. Abductor power 5/5.\par \par Left Hip:\par Skin intact. No surgical scars. No erythema. No ecchymosis. No swelling. No deformity. No focal tenderness.\par Painless ROM from full extension to 110-115 degrees of flexion. 20 degrees of internal rotation. 50-55 degrees of external rotation. 60 degrees of abduction. 15-20 degrees of adduction.\par ALAN painless. Impingement (FADIR) painless. Stinchfield painless.\par No crepitation. No instability. Abductor power 5/5.\par \par Bilateral Knees: Skin intact. No surgical scars. No erythema or ecchymosis. No swelling or effusion. No deformity. Painless and unrestricted range of motion. Central patellar tracking. No crepitation. No instability.\par \par Bilateral Feet:\par Normal strength.\par \par Neurological: Normal distal motor power. (+) Subjectively diminished sensation in the lateral femoral cutaneous nerve of the RLE. \par Cardiovascular: Lower extremities warm and well perfused. No peripheral edema. [de-identified] : X-ray imaging of the AP pelvis and bilateral hips done here today demonstrates right hip with well-fixed right THR in overall good alignment, left hip with minimal degenerative changes \par \par X-ray imaging of the lumbar spine done here today demonstrates multilevel degenerative changes

## 2019-10-31 NOTE — END OF VISIT
[FreeTextEntry3] : All medical record entries made by Sam Oshea acting as a scribe for the performing provider (Manjinder Moon MD and/or RUBY Lacy) on 10/31/2019. All entries were dictated to me by the performing medical provider. In signing this record, the medical provider affirms that they have personally performed the history, physical exam, assessment and plan and have also directed, reviewed and agreed to the documentation in the chart.

## 2019-11-04 ENCOUNTER — APPOINTMENT (OUTPATIENT)
Dept: MRI IMAGING | Facility: HOSPITAL | Age: 64
End: 2019-11-04
Payer: COMMERCIAL

## 2019-11-04 ENCOUNTER — OUTPATIENT (OUTPATIENT)
Dept: OUTPATIENT SERVICES | Facility: HOSPITAL | Age: 64
LOS: 1 days | End: 2019-11-04
Payer: COMMERCIAL

## 2019-11-04 DIAGNOSIS — Z98.89 OTHER SPECIFIED POSTPROCEDURAL STATES: Chronic | ICD-10-CM

## 2019-11-04 DIAGNOSIS — Z98.890 OTHER SPECIFIED POSTPROCEDURAL STATES: Chronic | ICD-10-CM

## 2019-11-04 PROCEDURE — 70547 MR ANGIOGRAPHY NECK W/O DYE: CPT

## 2019-11-04 PROCEDURE — 70551 MRI BRAIN STEM W/O DYE: CPT

## 2019-11-04 PROCEDURE — 70547 MR ANGIOGRAPHY NECK W/O DYE: CPT | Mod: 26

## 2019-11-04 PROCEDURE — 70551 MRI BRAIN STEM W/O DYE: CPT | Mod: 26

## 2019-11-05 ENCOUNTER — OTHER (OUTPATIENT)
Age: 64
End: 2019-11-05

## 2019-11-06 ENCOUNTER — OTHER (OUTPATIENT)
Age: 64
End: 2019-11-06

## 2019-11-27 PROBLEM — Z28.21 REFUSED INFLUENZA VACCINE: Status: ACTIVE | Noted: 2019-10-25

## 2019-12-13 ENCOUNTER — APPOINTMENT (OUTPATIENT)
Dept: INTERNAL MEDICINE | Facility: CLINIC | Age: 64
End: 2019-12-13

## 2020-08-17 ENCOUNTER — APPOINTMENT (OUTPATIENT)
Dept: INTERNAL MEDICINE | Facility: CLINIC | Age: 65
End: 2020-08-17
Payer: COMMERCIAL

## 2020-08-17 VITALS
OXYGEN SATURATION: 99 % | BODY MASS INDEX: 20.49 KG/M2 | SYSTOLIC BLOOD PRESSURE: 110 MMHG | HEART RATE: 84 BPM | HEIGHT: 64 IN | WEIGHT: 120 LBS | DIASTOLIC BLOOD PRESSURE: 74 MMHG | TEMPERATURE: 98.2 F

## 2020-08-17 DIAGNOSIS — R30.0 DYSURIA: ICD-10-CM

## 2020-08-17 DIAGNOSIS — R93.3 ABNORMAL FINDINGS ON DIAGNOSTIC IMAGING OF OTHER PARTS OF DIGESTIVE TRACT: ICD-10-CM

## 2020-08-17 DIAGNOSIS — E04.1 NONTOXIC SINGLE THYROID NODULE: ICD-10-CM

## 2020-08-17 PROCEDURE — 36415 COLL VENOUS BLD VENIPUNCTURE: CPT

## 2020-08-17 PROCEDURE — 99214 OFFICE O/P EST MOD 30 MIN: CPT | Mod: 25,GC

## 2020-08-17 RX ORDER — QUINIDINE SULFATE 200 MG
TABLET ORAL
Refills: 0 | Status: ACTIVE | COMMUNITY
Start: 2020-08-17

## 2020-08-17 RX ORDER — MECOBALAMIN 1000 MCG
1 TABLET,CHEWABLE ORAL
Refills: 0 | Status: ACTIVE | COMMUNITY
Start: 2020-08-17

## 2020-08-17 RX ORDER — ASCORBIC ACID 500 MG
TABLET,CHEWABLE ORAL
Refills: 0 | Status: ACTIVE | COMMUNITY
Start: 2020-08-17

## 2020-08-19 LAB
ALBUMIN SERPL ELPH-MCNC: 4.7 G/DL
ALP BLD-CCNC: 75 U/L
ALT SERPL-CCNC: 16 U/L
ANION GAP SERPL CALC-SCNC: 11 MMOL/L
APPEARANCE: CLEAR
AST SERPL-CCNC: 26 U/L
BACTERIA: NEGATIVE
BASOPHILS # BLD AUTO: 0.04 K/UL
BASOPHILS NFR BLD AUTO: 0.7 %
BILIRUB SERPL-MCNC: 0.6 MG/DL
BILIRUBIN URINE: NEGATIVE
BLOOD URINE: NEGATIVE
BUN SERPL-MCNC: 17 MG/DL
CALCIUM OXALATE CRYSTALS: ABNORMAL
CALCIUM SERPL-MCNC: 9.8 MG/DL
CHLORIDE SERPL-SCNC: 105 MMOL/L
CHOLEST SERPL-MCNC: 215 MG/DL
CHOLEST/HDLC SERPL: 2.9 RATIO
CO2 SERPL-SCNC: 27 MMOL/L
COLOR: YELLOW
CREAT SERPL-MCNC: 0.89 MG/DL
EOSINOPHIL # BLD AUTO: 0.12 K/UL
EOSINOPHIL NFR BLD AUTO: 2.1 %
ESTIMATED AVERAGE GLUCOSE: 131 MG/DL
GLUCOSE QUALITATIVE U: NEGATIVE
GLUCOSE SERPL-MCNC: 96 MG/DL
HBA1C MFR BLD HPLC: 6.2 %
HCT VFR BLD CALC: 39.6 %
HDLC SERPL-MCNC: 75 MG/DL
HGB BLD-MCNC: 12.1 G/DL
HYALINE CASTS: 3 /LPF
IMM GRANULOCYTES NFR BLD AUTO: 0.2 %
KETONES URINE: NEGATIVE
LDLC SERPL CALC-MCNC: 121 MG/DL
LEUKOCYTE ESTERASE URINE: NEGATIVE
LYMPHOCYTES # BLD AUTO: 3.42 K/UL
LYMPHOCYTES NFR BLD AUTO: 59.4 %
MAN DIFF?: NORMAL
MCHC RBC-ENTMCNC: 29.4 PG
MCHC RBC-ENTMCNC: 30.6 GM/DL
MCV RBC AUTO: 96.1 FL
MICROSCOPIC-UA: NORMAL
MONOCYTES # BLD AUTO: 0.37 K/UL
MONOCYTES NFR BLD AUTO: 6.4 %
NEUTROPHILS # BLD AUTO: 1.8 K/UL
NEUTROPHILS NFR BLD AUTO: 31.2 %
NITRITE URINE: NEGATIVE
PH URINE: 6.5
PLATELET # BLD AUTO: 303 K/UL
POTASSIUM SERPL-SCNC: 4.8 MMOL/L
PROT SERPL-MCNC: 7.5 G/DL
PROTEIN URINE: NORMAL
RBC # BLD: 4.12 M/UL
RBC # FLD: 13 %
RED BLOOD CELLS URINE: 2 /HPF
SODIUM SERPL-SCNC: 143 MMOL/L
SPECIFIC GRAVITY URINE: 1.03
SQUAMOUS EPITHELIAL CELLS: 1 /HPF
TRIGL SERPL-MCNC: 96 MG/DL
TSH SERPL-ACNC: 0.59 UIU/ML
UROBILINOGEN URINE: NORMAL
WBC # FLD AUTO: 5.76 K/UL
WHITE BLOOD CELLS URINE: 1 /HPF

## 2020-08-24 LAB — BACTERIA UR CULT: ABNORMAL

## 2020-09-18 ENCOUNTER — APPOINTMENT (OUTPATIENT)
Dept: GASTROENTEROLOGY | Facility: CLINIC | Age: 65
End: 2020-09-18

## 2020-09-28 ENCOUNTER — RESULT REVIEW (OUTPATIENT)
Age: 65
End: 2020-09-28

## 2020-09-28 ENCOUNTER — APPOINTMENT (OUTPATIENT)
Dept: RADIOLOGY | Facility: CLINIC | Age: 65
End: 2020-09-28
Payer: COMMERCIAL

## 2020-09-28 ENCOUNTER — APPOINTMENT (OUTPATIENT)
Dept: ULTRASOUND IMAGING | Facility: CLINIC | Age: 65
End: 2020-09-28
Payer: COMMERCIAL

## 2020-09-28 ENCOUNTER — APPOINTMENT (OUTPATIENT)
Dept: MAMMOGRAPHY | Facility: CLINIC | Age: 65
End: 2020-09-28
Payer: COMMERCIAL

## 2020-09-28 ENCOUNTER — OUTPATIENT (OUTPATIENT)
Dept: OUTPATIENT SERVICES | Facility: HOSPITAL | Age: 65
LOS: 1 days | End: 2020-09-28

## 2020-09-28 DIAGNOSIS — Z98.89 OTHER SPECIFIED POSTPROCEDURAL STATES: Chronic | ICD-10-CM

## 2020-09-28 DIAGNOSIS — Z98.890 OTHER SPECIFIED POSTPROCEDURAL STATES: Chronic | ICD-10-CM

## 2020-09-28 PROCEDURE — 76536 US EXAM OF HEAD AND NECK: CPT | Mod: 26

## 2020-09-28 PROCEDURE — 77063 BREAST TOMOSYNTHESIS BI: CPT | Mod: 26

## 2020-09-28 PROCEDURE — 77067 SCR MAMMO BI INCL CAD: CPT | Mod: 26

## 2020-09-28 PROCEDURE — 74019 RADEX ABDOMEN 2 VIEWS: CPT | Mod: 26

## 2020-10-05 ENCOUNTER — APPOINTMENT (OUTPATIENT)
Dept: INTERNAL MEDICINE | Facility: CLINIC | Age: 65
End: 2020-10-05
Payer: COMMERCIAL

## 2020-10-05 VITALS
WEIGHT: 122 LBS | OXYGEN SATURATION: 100 % | TEMPERATURE: 97.4 F | HEART RATE: 73 BPM | DIASTOLIC BLOOD PRESSURE: 71 MMHG | SYSTOLIC BLOOD PRESSURE: 100 MMHG | HEIGHT: 64 IN | BODY MASS INDEX: 20.83 KG/M2

## 2020-10-05 DIAGNOSIS — K21.9 GASTRO-ESOPHAGEAL REFLUX DISEASE W/OUT ESOPHAGITIS: ICD-10-CM

## 2020-10-05 DIAGNOSIS — G56.00 CARPAL TUNNEL SYNDROME, UNSPECIFIED UPPER LIMB: ICD-10-CM

## 2020-10-05 DIAGNOSIS — N30.00 ACUTE CYSTITIS W/OUT HEMATURIA: ICD-10-CM

## 2020-10-05 DIAGNOSIS — Z23 ENCOUNTER FOR IMMUNIZATION: ICD-10-CM

## 2020-10-05 LAB — HEMOCCULT STL QL IA: NEGATIVE

## 2020-10-05 PROCEDURE — 99214 OFFICE O/P EST MOD 30 MIN: CPT | Mod: 25,GC

## 2020-10-05 PROCEDURE — 90686 IIV4 VACC NO PRSV 0.5 ML IM: CPT | Mod: GC

## 2020-10-05 PROCEDURE — 90732 PPSV23 VACC 2 YRS+ SUBQ/IM: CPT | Mod: GC

## 2020-10-05 PROCEDURE — G0009: CPT | Mod: GC

## 2020-10-05 PROCEDURE — 90472 IMMUNIZATION ADMIN EACH ADD: CPT

## 2020-10-05 RX ORDER — ASPIRIN 325 MG/1
325 TABLET ORAL
Refills: 0 | Status: DISCONTINUED | COMMUNITY
Start: 2020-08-17 | End: 2020-10-05

## 2020-10-06 NOTE — REVIEW OF SYSTEMS
[Abdominal Pain] : abdominal pain [Dysuria] : dysuria [Frequency] : frequency [Back Pain] : back pain [Negative] : Heme/Lymph [Hematuria] : no hematuria

## 2020-10-06 NOTE — PHYSICAL EXAM
[Normal] : soft, non-tender, non-distended, no masses palpated, no HSM and normal bowel sounds [de-identified] : lower spinal tenderness with, paraspinal lipoma.

## 2020-10-06 NOTE — ASSESSMENT
[FreeTextEntry1] : 66 yo F PMHx GERD, hiatal hernia, OA, R total hip replacement, mild intermittent asthma, prediabetes, TIA, chronic migraines, and intermittent loss of vision in the left eye c/o burning with urination, GERD symptoms, persistent back pain and carpal tunnel.

## 2020-10-06 NOTE — HISTORY OF PRESENT ILLNESS
[FreeTextEntry1] : Follow up visit. [de-identified] : 64 yo F PMHx GERD, hiatal hernia, OA, R total hip replacement, mild intermittent asthma, prediabetes, TIA, chronic migraines, and intermittent loss of vision in the left eye here for follow up regarding burning with urination, GERD symptoms, persistent back pain and carpal tunnel. She stated that at her last visit she thought she was going to get abx for her UTI, however given weak U/A and + UCx, she was told to drink more water and to call if she had further symptoms of frequency and dysuria and abx would prescribed at that time. She did not call back and continues to endorse symptoms of dysuria and increased frequency.\par \par Also c/o of worsening GERD. She has not had the chance to see Dr. Mccallum yet but has an apt for November. States that her GERD is so bad she has to go to sleep without eating or she will have severe burning pain. She had tried PPI in the past for 1 week, but since it did not help her she stopped taking it. \par \par Continues to have bilateral carpal tunnel symptoms and is having difficulty at work. 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

## 2020-10-06 NOTE — PLAN
[FreeTextEntry1] : #UTI\par - still complaining of dysuria and increased frequency at this time. \par - nitrofurantoin 100mg BID ordered for suspected E. faecalis infection from previous UCx\par \par #GERD\par - pt only used PPI for 1 week without relief of symptoms and stopped using medications. \par - Pantoprazole 40 QD for 30 days to start today \par - f/u with Dr. Mccallum appt 11/3/2020\par \par #Carpal Tunnel \par - worsening carpal tunnel that is affecting the pt's job. \par - PT referral given \par \par #HCM\par - Flu vaccine administered in R arm \par - [id number] administed in R arm\par - RTC in 6 weeks after Alessio pelayo

## 2020-10-06 NOTE — END OF VISIT
[] : Resident [FreeTextEntry3] : Carpal tunnel-- no pain illicited on my exam today.  pt has been given stretches in past and would like to see physical therapist.\par \par GERD-- has appt in Nov\par \par UTI-- agree with treating

## 2020-10-20 ENCOUNTER — APPOINTMENT (OUTPATIENT)
Dept: NEUROLOGY | Facility: CLINIC | Age: 65
End: 2020-10-20

## 2020-11-03 ENCOUNTER — APPOINTMENT (OUTPATIENT)
Dept: GASTROENTEROLOGY | Facility: CLINIC | Age: 65
End: 2020-11-03
Payer: COMMERCIAL

## 2020-11-03 VITALS
HEART RATE: 96 BPM | HEIGHT: 64 IN | SYSTOLIC BLOOD PRESSURE: 115 MMHG | WEIGHT: 119.56 LBS | DIASTOLIC BLOOD PRESSURE: 73 MMHG | OXYGEN SATURATION: 98 % | BODY MASS INDEX: 20.41 KG/M2 | TEMPERATURE: 97.5 F

## 2020-11-03 DIAGNOSIS — K59.00 CONSTIPATION, UNSPECIFIED: ICD-10-CM

## 2020-11-03 PROCEDURE — 99072 ADDL SUPL MATRL&STAF TM PHE: CPT

## 2020-11-03 PROCEDURE — 99215 OFFICE O/P EST HI 40 MIN: CPT | Mod: 25

## 2020-11-03 NOTE — PHYSICAL EXAM
[General Appearance - Alert] : alert [General Appearance - In No Acute Distress] : in no acute distress [Sclera] : the sclera and conjunctiva were normal [Outer Ear] : the ears and nose were normal in appearance [Neck Appearance] : the appearance of the neck was normal [] : no respiratory distress [Heart Rate And Rhythm] : heart rate was normal and rhythm regular [Abdomen Soft] : soft [Abdomen Tenderness] : non-tender [Abnormal Walk] : normal gait [Skin Color & Pigmentation] : normal skin color and pigmentation [Oriented To Time, Place, And Person] : oriented to person, place, and time

## 2020-11-04 NOTE — ASSESSMENT
[FreeTextEntry1] : 64 female with pmh HLD, asthma , GERD presents for evaluation of heartburn \par \par Heartburn: GERD\par -has had extensive workup including EGD/MANO/impedance\par -currently symptoms sporadic and are related to her food habits and lifestyle\par -discussed in detail about diet and eating healthy\par -also discussed about regular meal times, consider light dinner in the evening\par -Pepcid as needed\par -follow up as needed if symptoms worsen\par \par Constipation: chronic\par -c scope 2017 was normal , no red flag signs, hb wnl \par -discussed diet and lifestyle in detail \par -discussed trial of magnesium 500 at bedtime or miralax once daily for soft regular BM s \par -if no improvement will consider further eval like ARM

## 2020-11-04 NOTE — HISTORY OF PRESENT ILLNESS
[Nausea] : denies nausea [Vomiting] : denies vomiting [Diarrhea] : denies diarrhea [Yellow Skin Or Eyes (Jaundice)] : denies jaundice [Abdominal Pain] : denies abdominal pain [Abdominal Swelling] : denies abdominal swelling [de-identified] : 64 female with pmh HLD, asthma , GERD presents for evaluation of heartburn .\par States she has had intermittent  heartburn for many years , currently 3-4 times a month. Does not like to take meds , feels usually is related to certain trigger foods like corn and some fruits. States she takes a smoothie for breakfast and then lunch around 2 pm and after that would just snack till 9 pm and then come home and snack and sleep as she has no time to cook. Sometimes even skips lunch. Also reports constipation for many years, bm every day, but small balls, no blood .\par \par Has had extensive evaluation in the past with GI/CT surgery and has had EGD and HRM and impedance studies, had a hiatal hernia but was deemed not a candidate for surgery .\par \par EGD: 2019\par Cscope 2017: normal

## 2020-12-07 ENCOUNTER — APPOINTMENT (OUTPATIENT)
Dept: INTERNAL MEDICINE | Facility: CLINIC | Age: 65
End: 2020-12-07

## 2021-02-23 ENCOUNTER — RX RENEWAL (OUTPATIENT)
Age: 66
End: 2021-02-23

## 2021-05-21 ENCOUNTER — APPOINTMENT (OUTPATIENT)
Dept: INTERNAL MEDICINE | Facility: CLINIC | Age: 66
End: 2021-05-21
Payer: COMMERCIAL

## 2021-05-21 ENCOUNTER — NON-APPOINTMENT (OUTPATIENT)
Age: 66
End: 2021-05-21

## 2021-05-21 VITALS
DIASTOLIC BLOOD PRESSURE: 78 MMHG | BODY MASS INDEX: 20.14 KG/M2 | TEMPERATURE: 97.1 F | SYSTOLIC BLOOD PRESSURE: 114 MMHG | HEART RATE: 72 BPM | HEIGHT: 64 IN | RESPIRATION RATE: 13 BRPM | WEIGHT: 118 LBS | OXYGEN SATURATION: 99 %

## 2021-05-21 DIAGNOSIS — J30.2 OTHER SEASONAL ALLERGIC RHINITIS: ICD-10-CM

## 2021-05-21 DIAGNOSIS — J45.909 UNSPECIFIED ASTHMA, UNCOMPLICATED: ICD-10-CM

## 2021-05-21 PROCEDURE — 99214 OFFICE O/P EST MOD 30 MIN: CPT | Mod: GC

## 2021-05-21 RX ORDER — ALBUTEROL SULFATE 90 UG/1
108 (90 BASE) INHALANT RESPIRATORY (INHALATION) EVERY 4 HOURS
Qty: 3 | Refills: 3 | Status: ACTIVE | COMMUNITY
Start: 2019-10-25 | End: 1900-01-01

## 2021-05-21 RX ORDER — ALBUTEROL SULFATE 2.5 MG/3ML
(2.5 MG/3ML) SOLUTION RESPIRATORY (INHALATION)
Qty: 1 | Refills: 1 | Status: ACTIVE | COMMUNITY
Start: 2021-05-21 | End: 1900-01-01

## 2021-05-21 RX ORDER — CETIRIZINE HYDROCHLORIDE 10 MG/1
10 CAPSULE, LIQUID FILLED ORAL
Qty: 30 | Refills: 3 | Status: ACTIVE | COMMUNITY
Start: 2021-05-21 | End: 1900-01-01

## 2021-05-24 ENCOUNTER — OUTPATIENT (OUTPATIENT)
Dept: OUTPATIENT SERVICES | Facility: HOSPITAL | Age: 66
LOS: 1 days | End: 2021-05-24
Payer: COMMERCIAL

## 2021-05-24 DIAGNOSIS — Z98.890 OTHER SPECIFIED POSTPROCEDURAL STATES: Chronic | ICD-10-CM

## 2021-05-24 DIAGNOSIS — Z98.89 OTHER SPECIFIED POSTPROCEDURAL STATES: Chronic | ICD-10-CM

## 2021-05-24 PROCEDURE — 71046 X-RAY EXAM CHEST 2 VIEWS: CPT

## 2021-05-24 PROCEDURE — 71046 X-RAY EXAM CHEST 2 VIEWS: CPT | Mod: 26

## 2021-09-30 ENCOUNTER — RESULT CHARGE (OUTPATIENT)
Age: 66
End: 2021-09-30

## 2021-09-30 ENCOUNTER — APPOINTMENT (OUTPATIENT)
Dept: INTERNAL MEDICINE | Facility: CLINIC | Age: 66
End: 2021-09-30
Payer: COMMERCIAL

## 2021-09-30 VITALS
DIASTOLIC BLOOD PRESSURE: 79 MMHG | TEMPERATURE: 97.4 F | SYSTOLIC BLOOD PRESSURE: 116 MMHG | HEIGHT: 64 IN | OXYGEN SATURATION: 100 % | HEART RATE: 80 BPM | BODY MASS INDEX: 19.46 KG/M2 | WEIGHT: 114 LBS

## 2021-09-30 DIAGNOSIS — M54.9 DORSALGIA, UNSPECIFIED: ICD-10-CM

## 2021-09-30 DIAGNOSIS — Z12.11 ENCOUNTER FOR SCREENING FOR MALIGNANT NEOPLASM OF COLON: ICD-10-CM

## 2021-09-30 DIAGNOSIS — G89.29 PAIN IN RIGHT HIP: ICD-10-CM

## 2021-09-30 DIAGNOSIS — Z12.4 ENCOUNTER FOR SCREENING FOR MALIGNANT NEOPLASM OF CERVIX: ICD-10-CM

## 2021-09-30 DIAGNOSIS — R73.03 PREDIABETES.: ICD-10-CM

## 2021-09-30 DIAGNOSIS — M25.551 PAIN IN RIGHT HIP: ICD-10-CM

## 2021-09-30 LAB — HBA1C MFR BLD HPLC: 5.9

## 2021-09-30 PROCEDURE — 99214 OFFICE O/P EST MOD 30 MIN: CPT | Mod: GC,25

## 2021-09-30 PROCEDURE — 83036 HEMOGLOBIN GLYCOSYLATED A1C: CPT | Mod: QW

## 2021-09-30 RX ORDER — FAMOTIDINE 20 MG/1
20 TABLET, FILM COATED ORAL
Qty: 30 | Refills: 0 | Status: DISCONTINUED | COMMUNITY
Start: 2020-11-03 | End: 2021-09-30

## 2021-10-18 LAB — HEMOCCULT STL QL IA: NEGATIVE

## 2021-10-21 ENCOUNTER — RESULT REVIEW (OUTPATIENT)
Age: 66
End: 2021-10-21

## 2021-10-21 ENCOUNTER — APPOINTMENT (OUTPATIENT)
Dept: MAMMOGRAPHY | Facility: CLINIC | Age: 66
End: 2021-10-21
Payer: COMMERCIAL

## 2021-10-21 ENCOUNTER — OUTPATIENT (OUTPATIENT)
Dept: OUTPATIENT SERVICES | Facility: HOSPITAL | Age: 66
LOS: 1 days | End: 2021-10-21

## 2021-10-21 DIAGNOSIS — Z98.890 OTHER SPECIFIED POSTPROCEDURAL STATES: Chronic | ICD-10-CM

## 2021-10-21 DIAGNOSIS — Z98.89 OTHER SPECIFIED POSTPROCEDURAL STATES: Chronic | ICD-10-CM

## 2021-10-21 PROCEDURE — 77063 BREAST TOMOSYNTHESIS BI: CPT | Mod: 26

## 2021-10-21 PROCEDURE — 77067 SCR MAMMO BI INCL CAD: CPT | Mod: 26

## 2021-12-23 ENCOUNTER — APPOINTMENT (OUTPATIENT)
Dept: INTERNAL MEDICINE | Facility: CLINIC | Age: 66
End: 2021-12-23

## 2022-01-07 ENCOUNTER — APPOINTMENT (OUTPATIENT)
Dept: INTERNAL MEDICINE | Facility: CLINIC | Age: 67
End: 2022-01-07
Payer: COMMERCIAL

## 2022-01-07 VITALS
BODY MASS INDEX: 19.57 KG/M2 | HEART RATE: 87 BPM | RESPIRATION RATE: 14 BRPM | TEMPERATURE: 97.4 F | DIASTOLIC BLOOD PRESSURE: 72 MMHG | OXYGEN SATURATION: 98 % | SYSTOLIC BLOOD PRESSURE: 115 MMHG | WEIGHT: 114 LBS

## 2022-01-07 DIAGNOSIS — Z23 ENCOUNTER FOR IMMUNIZATION: ICD-10-CM

## 2022-01-07 DIAGNOSIS — Z00.01 ENCOUNTER FOR GENERAL ADULT MEDICAL EXAMINATION WITH ABNORMAL FINDINGS: ICD-10-CM

## 2022-01-07 DIAGNOSIS — G43.909 MIGRAINE, UNSPECIFIED, NOT INTRACTABLE, W/OUT STATUS MIGRAINOSUS: ICD-10-CM

## 2022-01-07 DIAGNOSIS — M21.619 BUNION OF UNSPECIFIED FOOT: ICD-10-CM

## 2022-01-07 DIAGNOSIS — M81.0 AGE-RELATED OSTEOPOROSIS W/OUT CURRENT PATHOLOGICAL FRACTURE: ICD-10-CM

## 2022-01-07 DIAGNOSIS — G45.9 TRANSIENT CEREBRAL ISCHEMIC ATTACK, UNSPECIFIED: ICD-10-CM

## 2022-01-07 PROCEDURE — G0008: CPT

## 2022-01-07 PROCEDURE — 99396 PREV VISIT EST AGE 40-64: CPT | Mod: GC,25

## 2022-01-07 PROCEDURE — 90686 IIV4 VACC NO PRSV 0.5 ML IM: CPT

## 2022-01-07 PROCEDURE — 99213 OFFICE O/P EST LOW 20 MIN: CPT | Mod: GC,25

## 2022-01-07 RX ORDER — PANTOPRAZOLE 40 MG/1
40 TABLET, DELAYED RELEASE ORAL DAILY
Qty: 30 | Refills: 0 | Status: DISCONTINUED | COMMUNITY
Start: 2020-10-05 | End: 2022-01-07

## 2022-01-07 RX ORDER — MONTELUKAST 10 MG/1
10 TABLET, FILM COATED ORAL DAILY
Qty: 30 | Refills: 3 | Status: DISCONTINUED | COMMUNITY
Start: 2021-05-21 | End: 2022-01-07

## 2022-01-07 RX ORDER — BUDESONIDE AND FORMOTEROL FUMARATE DIHYDRATE 80; 4.5 UG/1; UG/1
80-4.5 AEROSOL RESPIRATORY (INHALATION) TWICE DAILY
Qty: 3 | Refills: 0 | Status: DISCONTINUED | COMMUNITY
Start: 2021-05-21 | End: 2022-01-07

## 2022-01-07 RX ORDER — ASPIRIN ENTERIC COATED TABLETS 81 MG 81 MG/1
81 TABLET, DELAYED RELEASE ORAL DAILY
Qty: 30 | Refills: 3 | Status: ACTIVE | COMMUNITY
Start: 2022-01-07 | End: 1900-01-01

## 2022-01-07 RX ORDER — NITROFURANTOIN (MONOHYDRATE/MACROCRYSTALS) 25; 75 MG/1; MG/1
100 CAPSULE ORAL TWICE DAILY
Qty: 10 | Refills: 0 | Status: DISCONTINUED | COMMUNITY
Start: 2020-10-05 | End: 2022-01-07

## 2022-01-07 RX ORDER — POLYETHYLENE GLYCOL 3350 17 G/17G
17 POWDER, FOR SOLUTION ORAL DAILY
Qty: 340 | Refills: 0 | Status: DISCONTINUED | COMMUNITY
Start: 2020-11-03 | End: 2022-01-07

## 2022-01-07 RX ORDER — FAMOTIDINE 10 MG/1
10 TABLET, FILM COATED ORAL DAILY
Refills: 0 | Status: ACTIVE | COMMUNITY
Start: 2022-01-07

## 2022-01-07 RX ORDER — ATORVASTATIN CALCIUM 10 MG/1
10 TABLET, FILM COATED ORAL
Qty: 90 | Refills: 1 | Status: ACTIVE | COMMUNITY
Start: 2022-01-07 | End: 1900-01-01

## 2022-01-10 ENCOUNTER — APPOINTMENT (OUTPATIENT)
Dept: INTERNAL MEDICINE | Facility: CLINIC | Age: 67
End: 2022-01-10

## 2022-01-10 ENCOUNTER — NON-APPOINTMENT (OUTPATIENT)
Age: 67
End: 2022-01-10

## 2022-01-11 LAB
25(OH)D3 SERPL-MCNC: 21.4 NG/ML
ALBUMIN SERPL ELPH-MCNC: 4.4 G/DL
ALP BLD-CCNC: 87 U/L
ALT SERPL-CCNC: 21 U/L
ANION GAP SERPL CALC-SCNC: 13 MMOL/L
AST SERPL-CCNC: 25 U/L
BILIRUB SERPL-MCNC: 0.7 MG/DL
BUN SERPL-MCNC: 17 MG/DL
CALCIUM SERPL-MCNC: 9.6 MG/DL
CHLORIDE SERPL-SCNC: 105 MMOL/L
CO2 SERPL-SCNC: 26 MMOL/L
CREAT SERPL-MCNC: 0.81 MG/DL
GLUCOSE SERPL-MCNC: 92 MG/DL
POTASSIUM SERPL-SCNC: 4.2 MMOL/L
PROT SERPL-MCNC: 7.5 G/DL
SODIUM SERPL-SCNC: 143 MMOL/L

## 2022-01-18 ENCOUNTER — APPOINTMENT (OUTPATIENT)
Dept: INTERNAL MEDICINE | Facility: CLINIC | Age: 67
End: 2022-01-18

## 2022-03-04 ENCOUNTER — APPOINTMENT (OUTPATIENT)
Dept: INTERNAL MEDICINE | Facility: CLINIC | Age: 67
End: 2022-03-04
Payer: SELF-PAY

## 2022-03-04 VITALS
HEIGHT: 64 IN | DIASTOLIC BLOOD PRESSURE: 74 MMHG | WEIGHT: 111 LBS | SYSTOLIC BLOOD PRESSURE: 118 MMHG | OXYGEN SATURATION: 98 % | TEMPERATURE: 97.6 F | HEART RATE: 66 BPM | BODY MASS INDEX: 18.95 KG/M2

## 2022-03-04 DIAGNOSIS — I63.9 CEREBRAL INFARCTION, UNSPECIFIED: ICD-10-CM

## 2022-03-04 DIAGNOSIS — R56.9 UNSPECIFIED CONVULSIONS: ICD-10-CM

## 2022-03-04 PROCEDURE — 99214 OFFICE O/P EST MOD 30 MIN: CPT | Mod: GC

## 2022-03-04 RX ORDER — LACOSAMIDE 100 MG/1
100 TABLET, FILM COATED ORAL TWICE DAILY
Refills: 0 | Status: ACTIVE | COMMUNITY

## 2022-03-04 RX ORDER — PANTOPRAZOLE 40 MG/1
40 TABLET, DELAYED RELEASE ORAL DAILY
Qty: 30 | Refills: 2 | Status: DISCONTINUED | COMMUNITY
Start: 2021-09-30 | End: 2022-03-04

## 2022-03-04 RX ORDER — LEVETIRACETAM 750 MG/1
750 TABLET, FILM COATED ORAL TWICE DAILY
Refills: 0 | Status: ACTIVE | COMMUNITY

## 2022-03-07 ENCOUNTER — NON-APPOINTMENT (OUTPATIENT)
Age: 67
End: 2022-03-07

## 2022-03-09 ENCOUNTER — NON-APPOINTMENT (OUTPATIENT)
Age: 67
End: 2022-03-09

## 2022-04-08 ENCOUNTER — NON-APPOINTMENT (OUTPATIENT)
Age: 67
End: 2022-04-08

## 2022-05-07 NOTE — DISCHARGE NOTE ADULT - NSCORESITESY/N_GEN_A_CORE_RD
Assumed care f patient at 0700hrs. Patient reports 10/10 pain; Patient lying on Left side in semi-fatal position, grimacing. Dr. Alvarez made aware of patient's pain. Patient denies other symptoms, awaiting transfer to [facility].
Report to EMT's. Patient agreed to take Toradol 30mg which he previously refused.
No

## 2022-06-15 ENCOUNTER — APPOINTMENT (OUTPATIENT)
Dept: INTERNAL MEDICINE | Facility: CLINIC | Age: 67
End: 2022-06-15
Payer: MEDICARE

## 2022-06-15 DIAGNOSIS — Z12.39 ENCOUNTER FOR OTHER SCREENING FOR MALIGNANT NEOPLASM OF BREAST: ICD-10-CM

## 2022-06-15 DIAGNOSIS — M85.859 OTHER SPECIFIED DISORDERS OF BONE DENSITY AND STRUCTURE, UNSPECIFIED THIGH: ICD-10-CM

## 2022-06-15 PROCEDURE — 99442: CPT | Mod: GC,95

## 2022-06-19 PROBLEM — M85.859 OSTEOPENIA OF NECK OF FEMUR, UNSPECIFIED LATERALITY: Status: ACTIVE | Noted: 2020-08-17

## 2022-06-19 PROBLEM — Z12.39 BREAST CANCER SCREENING: Status: ACTIVE | Noted: 2020-08-17

## 2022-06-24 NOTE — PROGRESS NOTE ADULT - PROBLEM SELECTOR PLAN 2
Anesthesia Pre Eval Note    Anesthesia ROS/Med Hx    Overall Review:  EKG was reviewed     Anesthetic Complication History:  Patient does not have a history of anesthetic complications      Pulmonary Review:  Patient does not have a pulmonary history      Neuro/Psych Review:  Patient does not have a neuro/psych history       Cardiovascular Review:  Exercise tolerance: good (>4 METS)  Positive for hyperlipidemia    GI/HEPATIC/RENAL Review:   Positive for liver disease     End/Other Review:  Positive for diabetes  Positive for obesity class III - 40.00 - 49.99  Additional Results:     ALLERGIES:  No Known Allergies       Last Labs        Component                Value               Date/Time                  WBC                      9.5                 06/09/2022 1428            RBC                      4.69                06/09/2022 1428            HGB                      12.0 (L)            06/09/2022 1428            HCT                      39.1                06/09/2022 1428            MCV                      83.4                06/09/2022 1428            MCH                      25.6 (L)            06/09/2022 1428            MCHC                     30.7 (L)            06/09/2022 1428            RDW-CV                   15.6 (H)            06/09/2022 1428            Sodium                   142                 06/09/2022 1428            Potassium                4.6                 06/09/2022 1428            Chloride                 107                 06/09/2022 1428            Carbon Dioxide           32                  06/09/2022 1428            Glucose                  127 (H)             06/09/2022 1428            BUN                      12                  06/09/2022 1428            Creatinine               1.02                06/09/2022 1428            Glomerular Filtrati*     82                  06/09/2022 1428            Calcium                  8.9                 06/09/2022 1428            PLT 320                 06/09/2022 7404        Past Medical History:  No date: Diabetes mellitus (CMS/HCC)  No date: High cholesterol    History reviewed. No pertinent surgical history. Prior to Admission medications :  Medication metFORMIN (GLUCOPHAGE) 500 MG tablet, Sig TAKE 1 TABLET BY MOUTH TWICE A DAY, Start Date 5/4/22, End Date , Taking? Yes, Authorizing Provider Eh Amador MD    Medication simvastatin (ZOCOR) 40 MG tablet, Sig Take 1 tablet by mouth nightly., Start Date 2/8/22, End Date 8/7/22, Taking? Yes, Authorizing Provider Eh Amador MD    Medication tamsulosin (FLOMAX) 0.4 MG Cap, Sig Take 1 capsule by mouth daily. , Start Date 8/5/21, End Date , Taking?  Yes, Authorizing Provider Eh Amador MD            Relevant Problems   No relevant active problems       Physical Exam     Airway   Mallampati: II  TM Distance: >3 FB  Neck ROM: Full  Neck: Non-tender and Able to place in sniff position  TMJ Mobility: Good    Cardiovascular  Cardiovascular exam normal  Cardio Rhythm: Regular  Cardio Rate: Normal    Head Assessment  Head assessment: Normocephalic and Atraumatic    General Assessment  General Assessment: Alert and oriented and No acute distress    Dental Exam    Patient has:  Poor Dentition    Pulmonary Exam  Pulmonary exam normal  Breath sounds clear to auscultation:  Yes    Abdominal Exam  Abdominal exam normal      Anesthesia Plan:    ASA Status: 3  Anesthesia Type: General    Induction: Intravenous  Preferred Airway Type: ETT  Maintenance: Inhalational  Premedication: IV      Post-op Pain Management: Per Surgeon and Single Shot Block      Checklist  Reviewed: Lab Results, EKG, Nursing Notes, Beta Blocker Status, Patient Summary, Care Everywhere, DNR Status, Allergies, Medications, Problem list, NPO Status, Past Med History and Outside Records  Consent/Risks Discussed Statement:  The proposed anesthetic plan, including its risks and benefits, have been discussed with the Patient along with the risks and benefits of alternatives. Questions were encouraged and answered and the patient and/or representative understands and agrees to proceed. I discussed with the patient (and/or patient's legal representative) the risks and benefits of the proposed anesthesia plan, General, which may include services performed by other anesthesia providers. Alternative anesthesia plans, if available, were reviewed with the patient (and/or patient's legal representative). Discussion has been held with the patient (and/or patient's legal representative) regarding risks of anesthesia, which include Nausea, Vomiting, Sore Throat, Dental Injury, Post-op Intubation, Allergic Reaction, ICU Admit, Need for Blood Transfusion, Intra-operative Awareness, Aspiration, Hypotension and Emergence Delirium and emergent situations that may require change in anesthesia plan. The patient (and/or patient's legal representative) has indicated understanding, his/her questions have been answered, and he/she wishes to proceed with the planned anesthetic.     Comments  Plan Comments: RECORDS, EKG, XRAYS REVIEWED WHEN AVAILABLE Duoneb PRN for SOB/wheezing patient complains of chronic cough and recently underwent endoscopy which showed evidence of reflux.   -Protonix 40 mg PO daily  -Robitussin as needed for cough,

## 2022-07-26 NOTE — PRE-OP CHECKLIST - BP NONINVASIVE DIASTOLIC (MM HG)
Recommend using a mild, moisturizing soap as Keratosis pilaris is exacerbated by dry skin.    Discussed [place] apricot scrub or other gently exfoliating wash or gentle use of Buf Puf.    OTC keratolytics (Am lactin, Carmol, Ureamide, Kerasal) recommended for daily use after bath or shower.    Other OTC options:  Bare 40 - amazon $18  Gold bond rough and bumpy skin cream  [place] In Shower Wash Off Lotion  Glytone wash and Cerave SA cream  Glytone KP kit - it's 2 steps ([facility]) - SE: sunburn  Differin every evening, am lactin every morning    
73

## 2023-07-11 NOTE — PATIENT PROFILE ADULT. - NS TRANSFER EYEGLASSES PAIRS
Detail Level: Generalized Bleaching Agents Recommendations: OTC Differin 0.1% gel QD Detail Level: Detailed Detail Level: Simple 1 pair

## 2025-02-24 NOTE — ED PROVIDER NOTE - TEMPLATE
Neuro
 86y Female , domiciled, has 24 hr home aide, no pphx, PMHx CAD, PAD s/p LLE Bypass cb thrombosis s/p L AKA, DM, HFrEF (23%), severe AS, AF on Eliquis, hypothyroidism, HTN, HLD who presents to [facility] ED s/p mechanical fall. - 2/22- S/P L Hip CRRP, psych consulted for capacity to refuse rehab.   pt states she doesn't want to to go to rehab, has been at rehab few years ago, was not happy with the experience. Pt states she is willing to have PT at home. Pt denies depression, anxiety, psychosis, joe. pt reports fair sleep, appetite. pt denies si/hi. pt able to state risks/benefits of treatment rehab. pt still does not want to go to rehab. as per PT note, home Pt is considered a reasonable alternative

## 2025-02-26 NOTE — ED ADULT NURSE NOTE - CAS TRG GEN SKIN CONDITION
Physical Therapy Visit    Visit Type: Daily Treatment Note  Visit: 7  Referring Provider: Renzo Galicia MD  Medical Diagnosis (from order): S46.011A - Traumatic tear of right rotator cuff, initial encounter     SUBJECTIVE                                                                                                               Today's visit was performed with the assistance of  Services.   Name of : Laure [id number]   Service used: [facility]: [email or web address]     Pt with no new complaints. Feels her shoulder is progressing and she has been working on HEP and has pulleys at home.     Pain / Symptoms  - Pain rating (out of 10): Current: 4       OBJECTIVE                                                                                                                     Range of Motion (ROM)   (degrees unless noted; active unless noted; norms in ( ); negative=lacking to 0, positive=beyond 0)  Shoulder:    - Flexion (180):         Right: 105   Passive: 124    - Extension (50):         Right: 47    - Abduction (180):         Right: 85 pain   Passive: 95    - Internal Rotation:        - Behind Back:            Right: to L5    - External Rotation:       - at 0°:             Right: 20   Passive: 45                       Treatment     Therapeutic Exercise  Seated shoulder pulleys - flex 3x10 - 120 deg;  ABD 2x10  B GH flex towel slide up wall x10  Standing yellow TB row 3x10 - added to HEP  Standing cane ER, towel under arm 2x10 R  Standing GH ext and IR with cane 2x10 each - added to HEP  Standing 2# wand biceps curls 3x10  Shoulder isometrics with ball: flex, ext IR and ER x10 each R  Seated R shoulder PROM in all directions    Skilled input: verbal instruction/cues, tactile instruction/cues and posture correction    Writer verbally educated and received verbal consent for hand placement, positioning of patient, and techniques to be performed today from patient for hand placement and  palpation for techniques and therapist position for techniques as described above and how they are pertinent to the patient's plan of care.  Home Exercise Program  Access Code: [email or web address]  URL: [email or web address]  Date: 02/04/2025  Prepared by: Ed Chicas  Exercises  - Seated Scapular Retraction  - 2-3 x daily - 7 x weekly - 1-3 sets - 10 reps  - Seated Shoulder Rolls  - 2-3 x daily - 7 x weekly - 1-3 sets - 10 reps  - Circular Shoulder Pendulum with Table Support  - 2-3 x daily - 7 x weekly - 1-3 sets - 10 reps  - Flexion-Extension Shoulder Pendulum with Table Support  - 2-3 x daily - 7 x weekly - 1-3 sets - 10 reps  - Horizontal Shoulder Pendulum with Table Support  - 2-3 x daily - 7 x weekly - 1-3 sets - 10 reps  Patient Education  - Ice  2/10: seated shoulder slide flex.   2/19: cane ER, flex and abd.       ASSESSMENT                                                                                                            Pt did well with progressions today and more added to her HEP. She is gradually improving with her ROM as noted with measures today but stiffness remains.     Medically necessary skilled therapy interventions continue to be required to allow the patient to maximize their functional abilities as noted above and to progress toward LTG's.  Pain/symptoms after session (out of 10): 2    PLAN                                                                                                                           Suggestions for next session as indicated: Progress per plan of care    Goals  Long Term Goals: to be met by end of plan of care  1. Quick DASH: Patient will score 30 or lower on The Quick DASH to indicate a decreased level of impairment with lifting, carrying, household and self-care activity. (minimal clinically important difference: 14 of calculated score)  2. Patient will reach overhead, out to side, and behind back with reported manageable/tolerable pain for  completion of self-care tasks such as dressing and grooming hair.    3. Patient will , lift, and carry a full laundry basket with reported manageable/tolerable pain and neutral spine alignment for completion of household tasks such as laundry.     4. Patient will lift >/= 2 lbs to and from at least 60 inches height 10 times with patient reported manageable/tolerable difficulty to demonstrate placing and retrieving dishes in a cabinet.    5. Patient will be independent with progressed and modified home exercise program.      Therapy procedure time and total treatment time can be found documented on the Time Entry flowsheet     Dry/Warm